# Patient Record
Sex: MALE | Race: OTHER
[De-identification: names, ages, dates, MRNs, and addresses within clinical notes are randomized per-mention and may not be internally consistent; named-entity substitution may affect disease eponyms.]

---

## 2020-06-07 ENCOUNTER — TRANSCRIPTION ENCOUNTER (OUTPATIENT)
Age: 60
End: 2020-06-07

## 2021-05-19 ENCOUNTER — TRANSCRIPTION ENCOUNTER (OUTPATIENT)
Age: 61
End: 2021-05-19

## 2021-05-23 ENCOUNTER — EMERGENCY (EMERGENCY)
Facility: HOSPITAL | Age: 61
LOS: 0 days | Discharge: HOME | End: 2021-05-23
Attending: EMERGENCY MEDICINE
Payer: COMMERCIAL

## 2021-05-23 VITALS
HEART RATE: 80 BPM | TEMPERATURE: 99 F | OXYGEN SATURATION: 94 % | SYSTOLIC BLOOD PRESSURE: 109 MMHG | RESPIRATION RATE: 21 BRPM | DIASTOLIC BLOOD PRESSURE: 70 MMHG

## 2021-05-23 VITALS — HEIGHT: 70 IN | WEIGHT: 190.04 LBS

## 2021-05-23 DIAGNOSIS — I25.2 OLD MYOCARDIAL INFARCTION: ICD-10-CM

## 2021-05-23 DIAGNOSIS — R06.02 SHORTNESS OF BREATH: ICD-10-CM

## 2021-05-23 DIAGNOSIS — R53.1 WEAKNESS: ICD-10-CM

## 2021-05-23 DIAGNOSIS — R19.7 DIARRHEA, UNSPECIFIED: ICD-10-CM

## 2021-05-23 DIAGNOSIS — R00.0 TACHYCARDIA, UNSPECIFIED: ICD-10-CM

## 2021-05-23 DIAGNOSIS — U07.1 COVID-19: ICD-10-CM

## 2021-05-23 DIAGNOSIS — R11.2 NAUSEA WITH VOMITING, UNSPECIFIED: ICD-10-CM

## 2021-05-23 LAB
ALBUMIN SERPL ELPH-MCNC: 3.3 G/DL — SIGNIFICANT CHANGE UP (ref 3.3–5)
ALP SERPL-CCNC: 61 U/L — SIGNIFICANT CHANGE UP (ref 40–120)
ALT FLD-CCNC: 25 U/L — SIGNIFICANT CHANGE UP (ref 12–78)
ANION GAP SERPL CALC-SCNC: 8 MMOL/L — SIGNIFICANT CHANGE UP (ref 5–17)
AST SERPL-CCNC: 26 U/L — SIGNIFICANT CHANGE UP (ref 15–37)
BASOPHILS # BLD AUTO: 0 K/UL — SIGNIFICANT CHANGE UP (ref 0–0.2)
BASOPHILS NFR BLD AUTO: 0 % — SIGNIFICANT CHANGE UP (ref 0–2)
BILIRUB SERPL-MCNC: 0.4 MG/DL — SIGNIFICANT CHANGE UP (ref 0.2–1.2)
BUN SERPL-MCNC: 25 MG/DL — HIGH (ref 7–23)
CALCIUM SERPL-MCNC: 8.7 MG/DL — SIGNIFICANT CHANGE UP (ref 8.5–10.1)
CHLORIDE SERPL-SCNC: 98 MMOL/L — SIGNIFICANT CHANGE UP (ref 96–108)
CO2 SERPL-SCNC: 23 MMOL/L — SIGNIFICANT CHANGE UP (ref 22–31)
CREAT SERPL-MCNC: 1.56 MG/DL — HIGH (ref 0.5–1.3)
EOSINOPHIL # BLD AUTO: 0 K/UL — SIGNIFICANT CHANGE UP (ref 0–0.5)
EOSINOPHIL NFR BLD AUTO: 0 % — SIGNIFICANT CHANGE UP (ref 0–6)
GLUCOSE SERPL-MCNC: 402 MG/DL — HIGH (ref 70–99)
HCT VFR BLD CALC: 41.5 % — SIGNIFICANT CHANGE UP (ref 39–50)
HGB BLD-MCNC: 13.9 G/DL — SIGNIFICANT CHANGE UP (ref 13–17)
IMM GRANULOCYTES NFR BLD AUTO: 0.2 % — SIGNIFICANT CHANGE UP (ref 0–1.5)
LACTATE SERPL-SCNC: 1.6 MMOL/L — SIGNIFICANT CHANGE UP (ref 0.7–2)
LYMPHOCYTES # BLD AUTO: 0.44 K/UL — LOW (ref 1–3.3)
LYMPHOCYTES # BLD AUTO: 10.6 % — LOW (ref 13–44)
MCHC RBC-ENTMCNC: 30.5 PG — SIGNIFICANT CHANGE UP (ref 27–34)
MCHC RBC-ENTMCNC: 33.5 GM/DL — SIGNIFICANT CHANGE UP (ref 32–36)
MCV RBC AUTO: 91.2 FL — SIGNIFICANT CHANGE UP (ref 80–100)
MONOCYTES # BLD AUTO: 0.24 K/UL — SIGNIFICANT CHANGE UP (ref 0–0.9)
MONOCYTES NFR BLD AUTO: 5.8 % — SIGNIFICANT CHANGE UP (ref 2–14)
NEUTROPHILS # BLD AUTO: 3.45 K/UL — SIGNIFICANT CHANGE UP (ref 1.8–7.4)
NEUTROPHILS NFR BLD AUTO: 83.4 % — HIGH (ref 43–77)
PLATELET # BLD AUTO: 116 K/UL — LOW (ref 150–400)
POTASSIUM SERPL-MCNC: 4.2 MMOL/L — SIGNIFICANT CHANGE UP (ref 3.5–5.3)
POTASSIUM SERPL-SCNC: 4.2 MMOL/L — SIGNIFICANT CHANGE UP (ref 3.5–5.3)
PROT SERPL-MCNC: 7.2 GM/DL — SIGNIFICANT CHANGE UP (ref 6–8.3)
RBC # BLD: 4.55 M/UL — SIGNIFICANT CHANGE UP (ref 4.2–5.8)
RBC # FLD: 12.1 % — SIGNIFICANT CHANGE UP (ref 10.3–14.5)
SODIUM SERPL-SCNC: 129 MMOL/L — LOW (ref 135–145)
TROPONIN I SERPL-MCNC: <0.015 NG/ML — SIGNIFICANT CHANGE UP (ref 0.01–0.04)
WBC # BLD: 4.14 K/UL — SIGNIFICANT CHANGE UP (ref 3.8–10.5)
WBC # FLD AUTO: 4.14 K/UL — SIGNIFICANT CHANGE UP (ref 3.8–10.5)

## 2021-05-23 PROCEDURE — 80053 COMPREHEN METABOLIC PANEL: CPT

## 2021-05-23 PROCEDURE — 85025 COMPLETE CBC W/AUTO DIFF WBC: CPT

## 2021-05-23 PROCEDURE — 99291 CRITICAL CARE FIRST HOUR: CPT

## 2021-05-23 PROCEDURE — 71045 X-RAY EXAM CHEST 1 VIEW: CPT

## 2021-05-23 PROCEDURE — 36415 COLL VENOUS BLD VENIPUNCTURE: CPT

## 2021-05-23 PROCEDURE — 83605 ASSAY OF LACTIC ACID: CPT

## 2021-05-23 PROCEDURE — 93005 ELECTROCARDIOGRAM TRACING: CPT

## 2021-05-23 PROCEDURE — 71045 X-RAY EXAM CHEST 1 VIEW: CPT | Mod: 26

## 2021-05-23 PROCEDURE — 84484 ASSAY OF TROPONIN QUANT: CPT

## 2021-05-23 PROCEDURE — M0245: CPT | Mod: XU

## 2021-05-23 PROCEDURE — 99291 CRITICAL CARE FIRST HOUR: CPT | Mod: 25

## 2021-05-23 PROCEDURE — 93010 ELECTROCARDIOGRAM REPORT: CPT

## 2021-05-23 RX ORDER — SODIUM CHLORIDE 9 MG/ML
1000 INJECTION INTRAMUSCULAR; INTRAVENOUS; SUBCUTANEOUS ONCE
Refills: 0 | Status: COMPLETED | OUTPATIENT
Start: 2021-05-23 | End: 2021-05-23

## 2021-05-23 RX ORDER — SODIUM CHLORIDE 9 MG/ML
250 INJECTION INTRAMUSCULAR; INTRAVENOUS; SUBCUTANEOUS
Refills: 0 | Status: COMPLETED | OUTPATIENT
Start: 2021-05-23 | End: 2021-05-23

## 2021-05-23 RX ORDER — ACETAMINOPHEN 500 MG
650 TABLET ORAL ONCE
Refills: 0 | Status: COMPLETED | OUTPATIENT
Start: 2021-05-23 | End: 2021-05-23

## 2021-05-23 RX ADMIN — Medication 650 MILLIGRAM(S): at 15:38

## 2021-05-23 RX ADMIN — SODIUM CHLORIDE 1000 MILLILITER(S): 9 INJECTION INTRAMUSCULAR; INTRAVENOUS; SUBCUTANEOUS at 15:38

## 2021-05-23 RX ADMIN — SODIUM CHLORIDE 25 MILLILITER(S): 9 INJECTION INTRAMUSCULAR; INTRAVENOUS; SUBCUTANEOUS at 17:57

## 2021-05-23 NOTE — ED PROVIDER NOTE - NS ED ROS FT
Constitutional: No fever or chills, +weakness  Eyes: No visual changes  HEENT: No throat pain  CV: No chest pain  Resp: +SOB, no cough  GI: No abd pain, +N/V/D  : No dysuria  MSK: No musculoskeletal pain  Skin: No rash  Neuro: No headache

## 2021-05-23 NOTE — ED ADULT NURSE NOTE - OBJECTIVE STATEMENT
+COVID since 5/19.  C/o weakness, headache, nausea and loss of appetite, intermittent SOB.  Wife tested positive first.  Did not receive vaccine.

## 2021-05-23 NOTE — ED PROVIDER NOTE - CRITICAL CARE ATTENDING CONTRIBUTION TO CARE
Thais PACK M.D. independently provided 35 minutes of critical care including but not limited to talking to consultants, speaking with patient and family and reviewing lab and radiology results.

## 2021-05-23 NOTE — ED PROVIDER NOTE - OBJECTIVE STATEMENT
62 y/o male with PMHx of presents to ED COVID+ c/o SOB and N/V/D. 62 y/o male with PMHx of presents to ED COVID+ c/o SOB, N/V/D and weakness. Pt tested positive at [facility] on the 19th, has positive results with him c/o SOB, N/V/D and weakness. Pt has not required any supplemental O2 but has been having vomiting and diarrhea, overall feels unwell. Has been quarantining at home, denies chest pain, urinary sx or any other complaints. SOB exacerbated with movement and partially alleviated by rest. No prior hx of DVT, PE or recent travel.

## 2021-05-23 NOTE — ED PROVIDER NOTE - PATIENT PORTAL LINK FT
You can access the FollowMyHealth Patient Portal offered by [facility] by registering at the following website: [email or web address]. By joining [employer]’s FollowMyHealth portal, you will also be able to view your health information using other applications (apps) compatible with our system.

## 2021-05-23 NOTE — ED PROVIDER NOTE - PROGRESS NOTE DETAILS
Titus SHETH for ED attending Dr. Lynch: Although pt meets sepsis criteria, pt is COVID +, do not believe pt is septic, will not give 31 cc per kilo bolus at this time. Titus Lynch  CXR: No pneumonia, possibly COVID related.   I Edwar Stevens attest that this documentation has been prepared under the direction and in the presence of Dr. Lynch. pt feeeling better, ambulated o2 sat stazble, will dc home with return precautions, no reactions from antibody infusion

## 2021-05-23 NOTE — ED PROVIDER NOTE - CLINICAL SUMMARY MEDICAL DECISION MAKING FREE TEXT BOX
62 y/o male COVID + p/w SOB, N/V/D, weakness, pt ambulated in room, o2 sats did not drop below 91%, pt has not required supplemental oxygen. Pt has hx of HTN, HLD, DM, is candidate for monoclonal antibodies, discussed with pt and pt is amenable to it, will give pt more information and give antibodies, will cont to get CXR, obtain blood work and treat.

## 2021-05-23 NOTE — ED PROVIDER NOTE - PHYSICAL EXAMINATION
Constitutional: NAD AAOx3, middle age male sitting in bed, +in mild distress  Eyes: PERRLA EOMI  Head: Normocephalic atraumatic  Mouth: MMM  Cardiac: regular rate   Resp: Lungs +coarse breath sounds bilaterally   GI: Abd s/nt/nd, no rebound or guarding.  Neuro: awake, alert, moving all extremities, cranial nerves 2-12 intact, sensation intact, no dysmetria.  Skin: No rashes

## 2021-05-25 ENCOUNTER — INPATIENT (INPATIENT)
Facility: HOSPITAL | Age: 61
LOS: 6 days | Discharge: HOME HEALTH | DRG: 177 | End: 2021-06-01
Attending: INTERNAL MEDICINE | Admitting: INTERNAL MEDICINE
Payer: COMMERCIAL

## 2021-05-25 ENCOUNTER — TRANSCRIPTION ENCOUNTER (OUTPATIENT)
Age: 61
End: 2021-05-25

## 2021-05-25 VITALS
DIASTOLIC BLOOD PRESSURE: 64 MMHG | HEART RATE: 100 BPM | OXYGEN SATURATION: 93 % | TEMPERATURE: 99 F | SYSTOLIC BLOOD PRESSURE: 112 MMHG | WEIGHT: 160.06 LBS | HEIGHT: 70 IN | RESPIRATION RATE: 18 BRPM

## 2021-05-25 DIAGNOSIS — J18.9 PNEUMONIA, UNSPECIFIED ORGANISM: ICD-10-CM

## 2021-05-25 PROBLEM — Z78.9 OTHER SPECIFIED HEALTH STATUS: Chronic | Status: ACTIVE | Noted: 2021-05-23

## 2021-05-25 LAB
ADD ON TEST-SPECIMEN IN LAB: SIGNIFICANT CHANGE UP
ALBUMIN SERPL ELPH-MCNC: 2.5 G/DL — LOW (ref 3.3–5)
ALBUMIN SERPL ELPH-MCNC: 2.8 G/DL — LOW (ref 3.3–5)
ALP SERPL-CCNC: 55 U/L — SIGNIFICANT CHANGE UP (ref 40–120)
ALP SERPL-CCNC: 56 U/L — SIGNIFICANT CHANGE UP (ref 40–120)
ALT FLD-CCNC: 21 U/L — SIGNIFICANT CHANGE UP (ref 12–78)
ALT FLD-CCNC: 22 U/L — SIGNIFICANT CHANGE UP (ref 12–78)
ANION GAP SERPL CALC-SCNC: 11 MMOL/L — SIGNIFICANT CHANGE UP (ref 5–17)
AST SERPL-CCNC: 28 U/L — SIGNIFICANT CHANGE UP (ref 15–37)
AST SERPL-CCNC: 30 U/L — SIGNIFICANT CHANGE UP (ref 15–37)
BASOPHILS # BLD AUTO: 0.01 K/UL — SIGNIFICANT CHANGE UP (ref 0–0.2)
BASOPHILS NFR BLD AUTO: 0.1 % — SIGNIFICANT CHANGE UP (ref 0–2)
BILIRUB DIRECT SERPL-MCNC: <0.1 MG/DL — SIGNIFICANT CHANGE UP (ref 0–0.2)
BILIRUB INDIRECT FLD-MCNC: >0.2 MG/DL — SIGNIFICANT CHANGE UP (ref 0.2–1)
BILIRUB SERPL-MCNC: 0.3 MG/DL — SIGNIFICANT CHANGE UP (ref 0.2–1.2)
BILIRUB SERPL-MCNC: 0.4 MG/DL — SIGNIFICANT CHANGE UP (ref 0.2–1.2)
BUN SERPL-MCNC: 20 MG/DL — SIGNIFICANT CHANGE UP (ref 7–23)
CALCIUM SERPL-MCNC: 8.9 MG/DL — SIGNIFICANT CHANGE UP (ref 8.5–10.1)
CHLORIDE SERPL-SCNC: 98 MMOL/L — SIGNIFICANT CHANGE UP (ref 96–108)
CO2 SERPL-SCNC: 20 MMOL/L — LOW (ref 22–31)
CREAT SERPL-MCNC: 1.13 MG/DL — SIGNIFICANT CHANGE UP (ref 0.5–1.3)
CREAT SERPL-MCNC: 1.42 MG/DL — HIGH (ref 0.5–1.3)
CRP SERPL-MCNC: 174 MG/L — HIGH
D DIMER BLD IA.RAPID-MCNC: 331 NG/ML DDU — HIGH
EOSINOPHIL # BLD AUTO: 0 K/UL — SIGNIFICANT CHANGE UP (ref 0–0.5)
EOSINOPHIL NFR BLD AUTO: 0 % — SIGNIFICANT CHANGE UP (ref 0–6)
FERRITIN SERPL-MCNC: 1310 NG/ML — HIGH (ref 30–400)
GLUCOSE SERPL-MCNC: 511 MG/DL — CRITICAL HIGH (ref 70–99)
HCT VFR BLD CALC: 41.3 % — SIGNIFICANT CHANGE UP (ref 39–50)
HGB BLD-MCNC: 13.6 G/DL — SIGNIFICANT CHANGE UP (ref 13–17)
IMM GRANULOCYTES NFR BLD AUTO: 0.8 % — SIGNIFICANT CHANGE UP (ref 0–1.5)
INR BLD: 1.4 RATIO — HIGH (ref 0.88–1.16)
LYMPHOCYTES # BLD AUTO: 0.62 K/UL — LOW (ref 1–3.3)
LYMPHOCYTES # BLD AUTO: 6.4 % — LOW (ref 13–44)
MCHC RBC-ENTMCNC: 30.4 PG — SIGNIFICANT CHANGE UP (ref 27–34)
MCHC RBC-ENTMCNC: 32.9 GM/DL — SIGNIFICANT CHANGE UP (ref 32–36)
MCV RBC AUTO: 92.2 FL — SIGNIFICANT CHANGE UP (ref 80–100)
MONOCYTES # BLD AUTO: 0.32 K/UL — SIGNIFICANT CHANGE UP (ref 0–0.9)
MONOCYTES NFR BLD AUTO: 3.3 % — SIGNIFICANT CHANGE UP (ref 2–14)
NEUTROPHILS # BLD AUTO: 8.68 K/UL — HIGH (ref 1.8–7.4)
NEUTROPHILS NFR BLD AUTO: 89.4 % — HIGH (ref 43–77)
PLATELET # BLD AUTO: 151 K/UL — SIGNIFICANT CHANGE UP (ref 150–400)
POTASSIUM SERPL-MCNC: 4.2 MMOL/L — SIGNIFICANT CHANGE UP (ref 3.5–5.3)
POTASSIUM SERPL-SCNC: 4.2 MMOL/L — SIGNIFICANT CHANGE UP (ref 3.5–5.3)
PROCALCITONIN SERPL-MCNC: 1.2 NG/ML — HIGH (ref 0.02–0.1)
PROT SERPL-MCNC: 6.9 GM/DL — SIGNIFICANT CHANGE UP (ref 6–8.3)
PROT SERPL-MCNC: 7.1 GM/DL — SIGNIFICANT CHANGE UP (ref 6–8.3)
PROTHROM AB SERPL-ACNC: 16.1 SEC — HIGH (ref 10.6–13.6)
RBC # BLD: 4.48 M/UL — SIGNIFICANT CHANGE UP (ref 4.2–5.8)
RBC # FLD: 12.5 % — SIGNIFICANT CHANGE UP (ref 10.3–14.5)
SODIUM SERPL-SCNC: 129 MMOL/L — LOW (ref 135–145)
TROPONIN I SERPL-MCNC: <0.015 NG/ML — SIGNIFICANT CHANGE UP (ref 0.01–0.04)
WBC # BLD: 9.71 K/UL — SIGNIFICANT CHANGE UP (ref 3.8–10.5)
WBC # FLD AUTO: 9.71 K/UL — SIGNIFICANT CHANGE UP (ref 3.8–10.5)

## 2021-05-25 PROCEDURE — 85610 PROTHROMBIN TIME: CPT

## 2021-05-25 PROCEDURE — 85027 COMPLETE CBC AUTOMATED: CPT

## 2021-05-25 PROCEDURE — 86803 HEPATITIS C AB TEST: CPT

## 2021-05-25 PROCEDURE — 82803 BLOOD GASES ANY COMBINATION: CPT

## 2021-05-25 PROCEDURE — 86769 SARS-COV-2 COVID-19 ANTIBODY: CPT

## 2021-05-25 PROCEDURE — 99285 EMERGENCY DEPT VISIT HI MDM: CPT | Mod: 25

## 2021-05-25 PROCEDURE — 85379 FIBRIN DEGRADATION QUANT: CPT

## 2021-05-25 PROCEDURE — 83036 HEMOGLOBIN GLYCOSYLATED A1C: CPT

## 2021-05-25 PROCEDURE — 36415 COLL VENOUS BLD VENIPUNCTURE: CPT

## 2021-05-25 PROCEDURE — 93010 ELECTROCARDIOGRAM REPORT: CPT

## 2021-05-25 PROCEDURE — 71045 X-RAY EXAM CHEST 1 VIEW: CPT | Mod: 26

## 2021-05-25 PROCEDURE — 99223 1ST HOSP IP/OBS HIGH 75: CPT

## 2021-05-25 PROCEDURE — 36600 WITHDRAWAL OF ARTERIAL BLOOD: CPT

## 2021-05-25 PROCEDURE — 80053 COMPREHEN METABOLIC PANEL: CPT

## 2021-05-25 PROCEDURE — 82565 ASSAY OF CREATININE: CPT

## 2021-05-25 PROCEDURE — 80076 HEPATIC FUNCTION PANEL: CPT

## 2021-05-25 PROCEDURE — 99285 EMERGENCY DEPT VISIT HI MDM: CPT

## 2021-05-25 PROCEDURE — 82962 GLUCOSE BLOOD TEST: CPT

## 2021-05-25 PROCEDURE — 86140 C-REACTIVE PROTEIN: CPT

## 2021-05-25 PROCEDURE — C9399: CPT

## 2021-05-25 PROCEDURE — 96374 THER/PROPH/DIAG INJ IV PUSH: CPT

## 2021-05-25 PROCEDURE — 94760 N-INVAS EAR/PLS OXIMETRY 1: CPT

## 2021-05-25 PROCEDURE — 82728 ASSAY OF FERRITIN: CPT

## 2021-05-25 RX ORDER — OXYCODONE HYDROCHLORIDE 5 MG/1
1 TABLET ORAL
Qty: 0 | Refills: 0 | DISCHARGE

## 2021-05-25 RX ORDER — METOPROLOL TARTRATE 50 MG
1 TABLET ORAL
Qty: 0 | Refills: 0 | DISCHARGE

## 2021-05-25 RX ORDER — FAMOTIDINE 10 MG/ML
20 INJECTION INTRAVENOUS AT BEDTIME
Refills: 0 | Status: DISCONTINUED | OUTPATIENT
Start: 2021-05-25 | End: 2021-06-01

## 2021-05-25 RX ORDER — INSULIN LISPRO 100 [IU]/ML
45 INJECTION, SUSPENSION SUBCUTANEOUS
Qty: 0 | Refills: 0 | DISCHARGE

## 2021-05-25 RX ORDER — INSULIN LISPRO 100/ML
VIAL (ML) SUBCUTANEOUS
Refills: 0 | Status: DISCONTINUED | OUTPATIENT
Start: 2021-05-25 | End: 2021-06-01

## 2021-05-25 RX ORDER — SODIUM CHLORIDE 9 MG/ML
1000 INJECTION INTRAMUSCULAR; INTRAVENOUS; SUBCUTANEOUS ONCE
Refills: 0 | Status: COMPLETED | OUTPATIENT
Start: 2021-05-25 | End: 2021-05-25

## 2021-05-25 RX ORDER — DEXAMETHASONE 0.5 MG/5ML
6 ELIXIR ORAL ONCE
Refills: 0 | Status: COMPLETED | OUTPATIENT
Start: 2021-05-25 | End: 2021-05-25

## 2021-05-25 RX ORDER — SIMVASTATIN 20 MG/1
20 TABLET, FILM COATED ORAL AT BEDTIME
Refills: 0 | Status: DISCONTINUED | OUTPATIENT
Start: 2021-05-25 | End: 2021-06-01

## 2021-05-25 RX ORDER — DEXAMETHASONE 0.5 MG/5ML
6 ELIXIR ORAL DAILY
Refills: 0 | Status: DISCONTINUED | OUTPATIENT
Start: 2021-05-25 | End: 2021-06-01

## 2021-05-25 RX ORDER — HYDROXYZINE HCL 10 MG
1 TABLET ORAL
Qty: 0 | Refills: 0 | DISCHARGE

## 2021-05-25 RX ORDER — REMDESIVIR 5 MG/ML
INJECTION INTRAVENOUS
Refills: 0 | Status: COMPLETED | OUTPATIENT
Start: 2021-05-25 | End: 2021-05-29

## 2021-05-25 RX ORDER — EMPAGLIFLOZIN AND LINAGLIPTIN 10; 5 MG/1; MG/1
1 TABLET, FILM COATED ORAL
Qty: 0 | Refills: 0 | DISCHARGE

## 2021-05-25 RX ORDER — FAMOTIDINE 10 MG/ML
1 INJECTION INTRAVENOUS
Qty: 0 | Refills: 0 | DISCHARGE

## 2021-05-25 RX ORDER — METOPROLOL TARTRATE 50 MG
100 TABLET ORAL DAILY
Refills: 0 | Status: DISCONTINUED | OUTPATIENT
Start: 2021-05-25 | End: 2021-06-01

## 2021-05-25 RX ORDER — SIMVASTATIN 20 MG/1
1 TABLET, FILM COATED ORAL
Qty: 0 | Refills: 0 | DISCHARGE

## 2021-05-25 RX ORDER — DEXTROSE 50 % IN WATER 50 %
25 SYRINGE (ML) INTRAVENOUS ONCE
Refills: 0 | Status: DISCONTINUED | OUTPATIENT
Start: 2021-05-25 | End: 2021-06-01

## 2021-05-25 RX ORDER — SODIUM CHLORIDE 9 MG/ML
1000 INJECTION, SOLUTION INTRAVENOUS
Refills: 0 | Status: DISCONTINUED | OUTPATIENT
Start: 2021-05-25 | End: 2021-06-01

## 2021-05-25 RX ORDER — GLIMEPIRIDE 1 MG
1 TABLET ORAL
Qty: 0 | Refills: 0 | DISCHARGE

## 2021-05-25 RX ORDER — ALPRAZOLAM 0.25 MG
1 TABLET ORAL
Qty: 0 | Refills: 0 | DISCHARGE

## 2021-05-25 RX ORDER — ASPIRIN/CALCIUM CARB/MAGNESIUM 324 MG
81 TABLET ORAL DAILY
Refills: 0 | Status: DISCONTINUED | OUTPATIENT
Start: 2021-05-25 | End: 2021-06-01

## 2021-05-25 RX ORDER — INSULIN LISPRO 100/ML
VIAL (ML) SUBCUTANEOUS AT BEDTIME
Refills: 0 | Status: DISCONTINUED | OUTPATIENT
Start: 2021-05-25 | End: 2021-06-01

## 2021-05-25 RX ORDER — HYDROXYZINE HCL 10 MG
25 TABLET ORAL THREE TIMES A DAY
Refills: 0 | Status: DISCONTINUED | OUTPATIENT
Start: 2021-05-25 | End: 2021-06-01

## 2021-05-25 RX ORDER — GLUCAGON INJECTION, SOLUTION 0.5 MG/.1ML
1 INJECTION, SOLUTION SUBCUTANEOUS ONCE
Refills: 0 | Status: DISCONTINUED | OUTPATIENT
Start: 2021-05-25 | End: 2021-06-01

## 2021-05-25 RX ORDER — ACETAMINOPHEN 500 MG
650 TABLET ORAL EVERY 4 HOURS
Refills: 0 | Status: DISCONTINUED | OUTPATIENT
Start: 2021-05-25 | End: 2021-06-01

## 2021-05-25 RX ORDER — OXYCODONE HYDROCHLORIDE 5 MG/1
10 TABLET ORAL EVERY 12 HOURS
Refills: 0 | Status: COMPLETED | OUTPATIENT
Start: 2021-05-25 | End: 2021-06-01

## 2021-05-25 RX ORDER — INSULIN GLARGINE 100 [IU]/ML
20 INJECTION, SOLUTION SUBCUTANEOUS AT BEDTIME
Refills: 0 | Status: DISCONTINUED | OUTPATIENT
Start: 2021-05-25 | End: 2021-05-26

## 2021-05-25 RX ORDER — ONDANSETRON 8 MG/1
4 TABLET, FILM COATED ORAL EVERY 6 HOURS
Refills: 0 | Status: DISCONTINUED | OUTPATIENT
Start: 2021-05-25 | End: 2021-06-01

## 2021-05-25 RX ORDER — INSULIN HUMAN 100 [IU]/ML
6 INJECTION, SOLUTION SUBCUTANEOUS ONCE
Refills: 0 | Status: COMPLETED | OUTPATIENT
Start: 2021-05-25 | End: 2021-05-25

## 2021-05-25 RX ORDER — ASPIRIN/CALCIUM CARB/MAGNESIUM 324 MG
1 TABLET ORAL
Qty: 0 | Refills: 0 | DISCHARGE

## 2021-05-25 RX ORDER — REMDESIVIR 5 MG/ML
100 INJECTION INTRAVENOUS EVERY 24 HOURS
Refills: 0 | Status: COMPLETED | OUTPATIENT
Start: 2021-05-26 | End: 2021-05-29

## 2021-05-25 RX ORDER — DEXTROSE 50 % IN WATER 50 %
15 SYRINGE (ML) INTRAVENOUS ONCE
Refills: 0 | Status: DISCONTINUED | OUTPATIENT
Start: 2021-05-25 | End: 2021-06-01

## 2021-05-25 RX ORDER — METFORMIN HYDROCHLORIDE 850 MG/1
1 TABLET ORAL
Qty: 0 | Refills: 0 | DISCHARGE

## 2021-05-25 RX ORDER — ALBUTEROL 90 UG/1
2 AEROSOL, METERED ORAL EVERY 4 HOURS
Refills: 0 | Status: DISCONTINUED | OUTPATIENT
Start: 2021-05-25 | End: 2021-06-01

## 2021-05-25 RX ORDER — ENOXAPARIN SODIUM 100 MG/ML
40 INJECTION SUBCUTANEOUS DAILY
Refills: 0 | Status: DISCONTINUED | OUTPATIENT
Start: 2021-05-25 | End: 2021-06-01

## 2021-05-25 RX ORDER — REMDESIVIR 5 MG/ML
200 INJECTION INTRAVENOUS EVERY 24 HOURS
Refills: 0 | Status: COMPLETED | OUTPATIENT
Start: 2021-05-25 | End: 2021-05-25

## 2021-05-25 RX ORDER — DUPILUMAB 300 MG/2ML
0 INJECTION, SOLUTION SUBCUTANEOUS
Qty: 0 | Refills: 0 | DISCHARGE

## 2021-05-25 RX ORDER — DEXTROSE 50 % IN WATER 50 %
12.5 SYRINGE (ML) INTRAVENOUS ONCE
Refills: 0 | Status: DISCONTINUED | OUTPATIENT
Start: 2021-05-25 | End: 2021-06-01

## 2021-05-25 RX ORDER — ALPRAZOLAM 0.25 MG
0.25 TABLET ORAL
Refills: 0 | Status: DISCONTINUED | OUTPATIENT
Start: 2021-05-25 | End: 2021-05-27

## 2021-05-25 RX ORDER — LOSARTAN POTASSIUM 100 MG/1
25 TABLET, FILM COATED ORAL DAILY
Refills: 0 | Status: DISCONTINUED | OUTPATIENT
Start: 2021-05-25 | End: 2021-06-01

## 2021-05-25 RX ORDER — FAMOTIDINE 10 MG/ML
40 INJECTION INTRAVENOUS AT BEDTIME
Refills: 0 | Status: DISCONTINUED | OUTPATIENT
Start: 2021-05-25 | End: 2021-05-25

## 2021-05-25 RX ORDER — OXYCODONE AND ACETAMINOPHEN 5; 325 MG/1; MG/1
1 TABLET ORAL EVERY 4 HOURS
Refills: 0 | Status: DISCONTINUED | OUTPATIENT
Start: 2021-05-25 | End: 2021-05-25

## 2021-05-25 RX ORDER — INSULIN LISPRO 100/ML
8 VIAL (ML) SUBCUTANEOUS
Refills: 0 | Status: DISCONTINUED | OUTPATIENT
Start: 2021-05-25 | End: 2021-06-01

## 2021-05-25 RX ORDER — LOSARTAN POTASSIUM 100 MG/1
1 TABLET, FILM COATED ORAL
Qty: 0 | Refills: 0 | DISCHARGE

## 2021-05-25 RX ADMIN — SODIUM CHLORIDE 1000 MILLILITER(S): 9 INJECTION INTRAMUSCULAR; INTRAVENOUS; SUBCUTANEOUS at 12:40

## 2021-05-25 RX ADMIN — INSULIN GLARGINE 20 UNIT(S): 100 INJECTION, SOLUTION SUBCUTANEOUS at 22:43

## 2021-05-25 RX ADMIN — SIMVASTATIN 20 MILLIGRAM(S): 20 TABLET, FILM COATED ORAL at 22:44

## 2021-05-25 RX ADMIN — Medication 8: at 18:32

## 2021-05-25 RX ADMIN — FAMOTIDINE 20 MILLIGRAM(S): 10 INJECTION INTRAVENOUS at 22:44

## 2021-05-25 RX ADMIN — REMDESIVIR 580 MILLIGRAM(S): 5 INJECTION INTRAVENOUS at 23:01

## 2021-05-25 RX ADMIN — OXYCODONE HYDROCHLORIDE 10 MILLIGRAM(S): 5 TABLET ORAL at 22:44

## 2021-05-25 RX ADMIN — Medication 6 MILLIGRAM(S): at 11:50

## 2021-05-25 RX ADMIN — Medication 4: at 22:44

## 2021-05-25 RX ADMIN — INSULIN HUMAN 6 UNIT(S): 100 INJECTION, SOLUTION SUBCUTANEOUS at 12:40

## 2021-05-25 NOTE — ED ADULT TRIAGE NOTE - CHIEF COMPLAINT QUOTE
pt presents to ED due to complaints of worsening SOB COVID + 5/17 received antibodies on Sunday when he was a pt at  ED

## 2021-05-25 NOTE — ED CLERICAL - NS ED CLERK NOTE PRE-ARRIVAL INFORMATION; ADDITIONAL PRE-ARRIVAL INFORMATION
This patient recently had Antibody Infusion for COVID and has active care navigation. This patient can be followed up by the care navigation team within 24 hours. To arrange close follow-up or to obtain additional clinical information about this patient, please call the contact number above

## 2021-05-25 NOTE — ED STATDOCS - NS_ ATTENDINGSCRIBEDETAILS _ED_A_ED_FT
I, Jasiel Rivas MD, performed the initial face to face bedside interview with this patient regarding history of present illness and determined that the patient should be seen in the main ED.  The history, was documented by the scribe in my presence and I attest to the accuracy of the documentation.

## 2021-05-25 NOTE — H&P ADULT - ASSESSMENT
62 y/o male with PMHX of IDDM, HTN, HLD who presented to  with progressive weakness related to COVID-19 infection.  Spo2 was 93% in ER at rest-- patient now 94% on 3L NC.      #Acute Hypoxic Respiratory Failure secondary to COVID -19 PNA:    Admit to medsurg.    Cont NC @ 3L.  Follow O2 sats.    IV decadron 6 mg IV daily.    IV remdesivir daily.    ID eval.    Trend inflammatory markers.  DDimer 300's.  Ferritin 1300.    S/p monoclonal AB infusion on 5/24.      #Uncontrolled IDDM:    Patient notes hasn't been eating/ has not taken insulin in 3 weeks.    Previously using Humalog 75/25 45 units BID.    Start lantus 20 qhs.    Start humalog 5 TID with meals.    Sliding scale.    Check A1C.    No signs/ sx of DKA at present.      #Hyponatremia:    Suspect more pseudohyponatremia due to hyperglycemia.  's.      #HTN:    Cont home meds.      #HLD:    Cont home meds.      #Chronic Pain:    Home pain meds nonformulary.    Start oxycontin 10 BID.    Percocet PRN.      #DVT Proph:    Lovenox.

## 2021-05-25 NOTE — H&P ADULT - NSHPREVIEWOFSYSTEMS_GEN_ALL_CORE
ROS:  General:  weakness  Skin: No rash or bothersome skin lesions  Musculoskeletal: No arthalgias, myalgias or joint swelling  Eyes: No visual changes or eye pain  Ears: No hearing loss , otorrhea or ear pain  Nose, Mouth, Throat: No nasal congestion, rhinorrhea, oral lesions, postnasal drip or sore throat  Cardio: No chest pain or palpitations. no lower extremity edema. no syncope. no claudication.   Respiratory: No cough, shortness of breath or wheezing   GI: diarrhea. loss of appetitie  : No urinary frequency, hematuria, incontinence, or dysuria  Neurologic: No headaches, parasthesias, confusion, dysarthria or gait instability  Psychiatric:  No anxiety or depression  Lymphatic:  No easy bruising, easy bleeding or swollen glands  Allergic: No itching, sneezing , watery eyes, clear rhinorrhea or recurrent infections

## 2021-05-25 NOTE — PHARMACOTHERAPY INTERVENTION NOTE - COMMENTS
Spoke with Dr. Cooper. Recommended and changed famotidine 40mg to 20mg every night at bedtime based on current CrCl 54.68.

## 2021-05-25 NOTE — ED STATDOCS - PROGRESS NOTE DETAILS
Titus Wright for attending Dr. Rivas: 60 y/o male with no significant PMHx presents to the ED c/o SOB, weakness, diarrhea. Started with symptoms on 5/19. Wife and grandchildren COVID +. Pt received monoclonal antibodies on 5/23.  On exam pt sat 91 to 92% on 2L of O2. Will send pt to main ED for further evaluation.

## 2021-05-25 NOTE — ED PROVIDER NOTE - OBJECTIVE STATEMENT
60 y/o male with a PMHx of HTN, DM presents to the ED c/o increased SOB. Started with symptoms on 5/19 and pt tested + for COVID on the same day with family testing +. Pt received monoclonal antibodies on 5/23 at [facility] but has subsequently worsening SOB, general weakness/fatigue/malaise. NKDA.

## 2021-05-25 NOTE — H&P ADULT - NSHPPHYSICALEXAM_GEN_ALL_CORE
PEx  T(C): 37.2 (05-25-21 @ 12:00), Max: 37.3 (05-25-21 @ 11:09)  HR: 81 (05-25-21 @ 16:00) (78 - 100)  BP: 115/78 (05-25-21 @ 16:00) (110/69 - 117/68)  RR: 22 (05-25-21 @ 16:00) (18 - 26)  SpO2: 95% (05-25-21 @ 16:00) (93% - 95%)    General:     Well appearing, well nourished in no distress, no identifying marks , scars, or tattoos.  Skin: no rash or prominent lesions  Head: normocephalic, atraumatic     Sinuses: non-tender  Nose: no external lesions, mucosa non-inflamed, septum and turbinates normal  Throat: no erythema, exudates or lesions.  Neck: Supple without lymphadenopathy. Thyroid no thyromegaly, no palpable thyroid nodules, no palpable nodules or masses, carotid arteries no bruits.   Breasts: No palpable masses or lesions.  Heart: RRR, no murmur or gallop.  Normal S1, S2.  No S3, S4.   Lungs: CTA bilaterally, no wheezes, rhonchi, rales.  Breathing unlabored.   Chest wall: Normal insp   Abdomen:  Soft, NT/ND, normal bowel sounds, no HSM, no masses.  No peritoneal signs.   Back: spine normal without deformity or tenderness.  Normal ROM   : Exam normal.  no inguinal hernias.  Extremities: No deformities, clubbing, cyanosis, or edema.  Musculoskeletal: Normal gait and station. No decreased range of motion, instability, atrophy or abnormal strength or tone in the head, neck, spine, ribs, pelvis or extremities.   Neurologic: CN 2-12 normal. Sensation to pain, touch and proprioception normal. DTRs normal in upper and lower extremities. No pathologic reflexes.  Motor normal.  Psychiatric: Oriented X3, intact recent and remote memory, judgement and insight, normal mood and affect.

## 2021-05-25 NOTE — ED PROVIDER NOTE - CARE PLAN
Principal Discharge DX:	Bilateral pneumonia  Secondary Diagnosis:	COVID-19  Secondary Diagnosis:	Hypoxia  Secondary Diagnosis:	Uncontrolled diabetes mellitus

## 2021-05-25 NOTE — H&P ADULT - HISTORY OF PRESENT ILLNESS
60 y/o male with PMHX of IDDM, HTN, HLD who presented to  with progressive weakness related to COVID-19 infection.  Patient notes he first starting having symptoms last cony May 16th.  He had multiple negative tests initially but tested positive as of 5/19.  Patient notes several family members at home including his wife and grandson have also tested positive.  Patient states his main symptom has been fatigue/ weakness/ loss of appetite.  Patient notes no real fevers-- Tmax 99.  No significant coughing or SOB.  He has had loss of appetite/ nausea/ and some diarrhea with stool incontinence.  Yesterday, he presented to  ER and was given monoclonal antibodies and was told to return to the ER if sx were not improved.  Patient returns today.  Spo2 was 93% in ER at rest-- patient now 94% on 3L NC.        PAST MEDICAL & SURGICAL HISTORY:  IDDM  HTN  HLD  Chronic pain    FAMILY HISTORY:  Family hx of HTN    Social History:    Social ETOH.    No tob.    No drugs.    Lives at home with family.      Allergies:  No Known Allergies    Home Medications:  ALPRAZolam 0.25 mg oral tablet: 1 tab(s) orally 2 times a day, As Needed - for anxiety (25 May 2021 15:15)  Aspirin Enteric Coated 81 mg oral delayed release tablet: 1 tab(s) orally once a day (25 May 2021 15:15)  Dupixent 300 mg/2 mL subcutaneous solution: Use as directed  ***dose per DrFirst*** (25 May 2021 15:15)  famotidine 40 mg oral tablet: 1 tab(s) orally once a day (at bedtime) (25 May 2021 15:15)  glimepiride 2 mg oral tablet: 1 tab(s) orally once a day (25 May 2021 15:15)  Glyxambi 25 mg-5 mg oral tablet: 1 tab(s) orally once a day (in the morning) (25 May 2021 15:15)  HumaLOG Mix 75/25 subcutaneous suspension: 45 unit(s) subcutaneously 2 times a day (25 May 2021 15:15)  hydrocodone-acetaminophen 10 mg-325 mg oral tablet: 1 tab(s) orally every 4 hours, As Needed for pain  ***per DrFirst*** (25 May 2021 15:15)  hydrOXYzine hydrochloride 10 mg oral tablet: 1 tab(s) orally 3 times a day, As Needed for itching (25 May 2021 15:15)  losartan 25 mg oral tablet: 1 tab(s) orally once a day (25 May 2021 15:15)  metFORMIN 500 mg oral tablet, extended release: 1 tab(s) orally once a day (25 May 2021 15:15)  metoprolol succinate 100 mg oral tablet, extended release: 1 tab(s) orally once a day (25 May 2021 15:15)  simvastatin 20 mg oral tablet: 1 tab(s) orally once a day (at bedtime) (25 May 2021 15:15)  Xtampza ER 9 mg oral capsule, extended release: 1 cap(s) orally every 12 hours  ***On DrFirst- not on list from MD- unable to confirm if pt takes*** (25 May 2021 15:15)

## 2021-05-25 NOTE — ED ADULT NURSE REASSESSMENT NOTE - NS ED NURSE REASSESS COMMENT FT1
MD notified of repeat BGM value and will cont to monitor per MD.  Pt awaiting admission orders and bed availability with pt verbalizing awareness of POC.

## 2021-05-25 NOTE — H&P ADULT - NSHPLABSRESULTS_GEN_ALL_CORE
13.6   9.71  )-----------( 151      ( 25 May 2021 11:43 )             41.3     05-25    129<L>  |  98  |  20  ----------------------------<  511<HH>  4.2   |  20<L>  |  1.42<H>    Ca    8.9      25 May 2021 11:43    TPro  7.1  /  Alb  2.8<L>  /  TBili  0.4  /  DBili  x   /  AST  28  /  ALT  21  /  AlkPhos  56  05-25    CAPILLARY BLOOD GLUCOSE      POCT Blood Glucose.: 400 mg/dL (25 May 2021 15:32)  POCT Blood Glucose.: 473 mg/dL (25 May 2021 12:37)    < from: Xray Chest 1 View- PORTABLE-Urgent (05.25.21 @ 12:53) >    INTERPRETATION:  AP chest on May 25, 2021 at 12:45 PM. Patient is short of breath with cough and fever.    Heart magnified by technique.    Small infiltrate off right upper hilum noted.    This infiltrate more visible than on May 23.    IMPRESSION: Small infiltrate off right upper hilum somewhat increased from prior.    < end of copied text >

## 2021-05-25 NOTE — ED PROVIDER NOTE - CLINICAL SUMMARY MEDICAL DECISION MAKING FREE TEXT BOX
62 y/o male with a PMHx of HTN, DM, presents to the ED c/o increased SOB, generalized weakness/fatigue/malaise. Sx started on  5/19 and pt tested + for COVID on the same day with family testing +. Pt received monoclonal antibodies on 5/23 at [facility]. Today pt hypoxic.    Plan; O2 supplementation, COVID precautions, EKG, CXR, labs including blood cultures (only 1 indicated because of COVID), COVID inflammatory serum marker, IV decadron, IV fluids, monitor, observe and reassess. Pt for admission.  Glucose 500; subcu humulin, diabetic diet, awaiting rest of labs including bicarb level..

## 2021-05-25 NOTE — ED ADULT NURSE NOTE - OBJECTIVE STATEMENT
pt reports worsening covid symptoms. tested positive wednesdasy 5/19. pt s/p antibody treatment at  on sunday. pt states lightheadedness and watery stool, shortness of breath. - abd pain - cramping. pt aaox4, breathing spontaneously w o2 saturation 94% on supplemental O2 3L. pt reports worsening covid symptoms. pt states tested positive wednesdasy 5/19. pt s/p antibody treatment at  on sunday. pt states lightheadedness and watery stool, shortness of breath. - abd pain - cramping. pt aaox4, breathing spontaneously w o2 saturation 94% on supplemental O2 3L. pt reports worsening covid symptoms. pt states tested positive Wednesday 5/19, received antibody treatment at  on sunday. pt states lightheadedness and watery stool, shortness of breath. - abd pain - cramping. pt aaox4, breathing spontaneously w o2 saturation 94% on supplemental O2 3L. pt reports worsening covid symptoms. pt states tested positive Wednesday 5/19, received antibody treatment at  on sunday. pt states occurrences of lightheadedness and watery stool at home, as well as increasing shortness of breath. denies abd pain or cramping.   pt aaox4, moving all extremities equally, breathing spontaneously w o2 saturation 94% on supplemental O2 3L. pt reports worsening covid symptoms. pt states tested positive Wednesday 5/19, received antibody treatment at  on sunday. pt states occurrences of lightheadedness and watery stool at home, as well as increasing shortness of breath. denies abd pain or cramping.   pt aaox4, but poor historian. moving all extremities equally, breathing spontaneously w o2 saturation 94% on supplemental O2 3L.

## 2021-05-25 NOTE — ED ADULT NURSE NOTE - NSIMPLEMENTINTERV_GEN_ALL_ED
Implemented All Fall with Harm Risk Interventions:  [place] to call system. Call bell, personal items and telephone within reach. Instruct patient to call for assistance. Room bathroom lighting operational. Non-slip footwear when patient is off stretcher. Physically safe environment: no spills, clutter or unnecessary equipment. Stretcher in lowest position, wheels locked, appropriate side rails in place. Provide visual cue, wrist band, yellow gown, etc. Monitor gait and stability. Monitor for mental status changes and reorient to person, place, and time. Review medications for side effects contributing to fall risk. Reinforce activity limits and safety measures with patient and family. Provide visual clues: red socks.

## 2021-05-25 NOTE — ED ADULT NURSE REASSESSMENT NOTE - NS ED NURSE REASSESS COMMENT FT1
Pt remains as previously assessed.  No c/o pain or discomfort, in no apparent distress.  Plan of care, transition from ED to admitted status discussed with pt.  All questions answered to pt satisfaction.   Call bell given to patient.  No additional needs at this time, will continue hourly rounding.

## 2021-05-26 LAB
A1C WITH ESTIMATED AVERAGE GLUCOSE RESULT: 9.6 % — HIGH (ref 4–5.6)
ALBUMIN SERPL ELPH-MCNC: 2.4 G/DL — LOW (ref 3.3–5)
ALP SERPL-CCNC: 57 U/L — SIGNIFICANT CHANGE UP (ref 40–120)
ALT FLD-CCNC: 21 U/L — SIGNIFICANT CHANGE UP (ref 12–78)
ANION GAP SERPL CALC-SCNC: 7 MMOL/L — SIGNIFICANT CHANGE UP (ref 5–17)
AST SERPL-CCNC: 28 U/L — SIGNIFICANT CHANGE UP (ref 15–37)
BILIRUB SERPL-MCNC: 0.4 MG/DL — SIGNIFICANT CHANGE UP (ref 0.2–1.2)
BUN SERPL-MCNC: 27 MG/DL — HIGH (ref 7–23)
CALCIUM SERPL-MCNC: 9 MG/DL — SIGNIFICANT CHANGE UP (ref 8.5–10.1)
CHLORIDE SERPL-SCNC: 104 MMOL/L — SIGNIFICANT CHANGE UP (ref 96–108)
CO2 SERPL-SCNC: 24 MMOL/L — SIGNIFICANT CHANGE UP (ref 22–31)
COVID-19 SPIKE DOMAIN AB INTERP: POSITIVE
COVID-19 SPIKE DOMAIN ANTIBODY RESULT: 70.2 U/ML — HIGH
CREAT SERPL-MCNC: 1.07 MG/DL — SIGNIFICANT CHANGE UP (ref 0.5–1.3)
ESTIMATED AVERAGE GLUCOSE: 229 MG/DL — HIGH (ref 68–114)
GLUCOSE SERPL-MCNC: 329 MG/DL — HIGH (ref 70–99)
HCT VFR BLD CALC: 41.2 % — SIGNIFICANT CHANGE UP (ref 39–50)
HCV AB S/CO SERPL IA: 0.09 S/CO — SIGNIFICANT CHANGE UP (ref 0–0.99)
HCV AB SERPL-IMP: SIGNIFICANT CHANGE UP
HGB BLD-MCNC: 13.4 G/DL — SIGNIFICANT CHANGE UP (ref 13–17)
INR BLD: 1.32 RATIO — HIGH (ref 0.88–1.16)
MCHC RBC-ENTMCNC: 30.2 PG — SIGNIFICANT CHANGE UP (ref 27–34)
MCHC RBC-ENTMCNC: 32.5 GM/DL — SIGNIFICANT CHANGE UP (ref 32–36)
MCV RBC AUTO: 92.8 FL — SIGNIFICANT CHANGE UP (ref 80–100)
PLATELET # BLD AUTO: 180 K/UL — SIGNIFICANT CHANGE UP (ref 150–400)
POTASSIUM SERPL-MCNC: 4.3 MMOL/L — SIGNIFICANT CHANGE UP (ref 3.5–5.3)
POTASSIUM SERPL-SCNC: 4.3 MMOL/L — SIGNIFICANT CHANGE UP (ref 3.5–5.3)
PROT SERPL-MCNC: 6.9 GM/DL — SIGNIFICANT CHANGE UP (ref 6–8.3)
PROTHROM AB SERPL-ACNC: 15.1 SEC — HIGH (ref 10.6–13.6)
RBC # BLD: 4.44 M/UL — SIGNIFICANT CHANGE UP (ref 4.2–5.8)
RBC # FLD: 12.8 % — SIGNIFICANT CHANGE UP (ref 10.3–14.5)
SARS-COV-2 IGG+IGM SERPL QL IA: 70.2 U/ML — HIGH
SARS-COV-2 IGG+IGM SERPL QL IA: POSITIVE
SODIUM SERPL-SCNC: 135 MMOL/L — SIGNIFICANT CHANGE UP (ref 135–145)
WBC # BLD: 8.43 K/UL — SIGNIFICANT CHANGE UP (ref 3.8–10.5)
WBC # FLD AUTO: 8.43 K/UL — SIGNIFICANT CHANGE UP (ref 3.8–10.5)

## 2021-05-26 PROCEDURE — 99232 SBSQ HOSP IP/OBS MODERATE 35: CPT

## 2021-05-26 RX ORDER — INSULIN GLARGINE 100 [IU]/ML
45 INJECTION, SOLUTION SUBCUTANEOUS AT BEDTIME
Refills: 0 | Status: DISCONTINUED | OUTPATIENT
Start: 2021-05-26 | End: 2021-06-01

## 2021-05-26 RX ADMIN — Medication 5 UNIT(S): at 11:42

## 2021-05-26 RX ADMIN — Medication 100 MILLIGRAM(S): at 10:28

## 2021-05-26 RX ADMIN — Medication 5 UNIT(S): at 17:06

## 2021-05-26 RX ADMIN — OXYCODONE HYDROCHLORIDE 10 MILLIGRAM(S): 5 TABLET ORAL at 10:28

## 2021-05-26 RX ADMIN — Medication 4: at 17:06

## 2021-05-26 RX ADMIN — OXYCODONE HYDROCHLORIDE 10 MILLIGRAM(S): 5 TABLET ORAL at 21:45

## 2021-05-26 RX ADMIN — Medication 5 UNIT(S): at 08:41

## 2021-05-26 RX ADMIN — REMDESIVIR 540 MILLIGRAM(S): 5 INJECTION INTRAVENOUS at 21:47

## 2021-05-26 RX ADMIN — Medication 8: at 11:41

## 2021-05-26 RX ADMIN — FAMOTIDINE 20 MILLIGRAM(S): 10 INJECTION INTRAVENOUS at 21:45

## 2021-05-26 RX ADMIN — Medication 8: at 08:40

## 2021-05-26 RX ADMIN — LOSARTAN POTASSIUM 25 MILLIGRAM(S): 100 TABLET, FILM COATED ORAL at 10:29

## 2021-05-26 RX ADMIN — Medication 2: at 21:44

## 2021-05-26 RX ADMIN — INSULIN GLARGINE 35 UNIT(S): 100 INJECTION, SOLUTION SUBCUTANEOUS at 21:45

## 2021-05-26 RX ADMIN — ENOXAPARIN SODIUM 40 MILLIGRAM(S): 100 INJECTION SUBCUTANEOUS at 10:28

## 2021-05-26 RX ADMIN — Medication 6 MILLIGRAM(S): at 10:29

## 2021-05-26 RX ADMIN — SIMVASTATIN 20 MILLIGRAM(S): 20 TABLET, FILM COATED ORAL at 21:45

## 2021-05-26 RX ADMIN — OXYCODONE HYDROCHLORIDE 10 MILLIGRAM(S): 5 TABLET ORAL at 11:15

## 2021-05-26 RX ADMIN — Medication 81 MILLIGRAM(S): at 10:29

## 2021-05-26 NOTE — PROGRESS NOTE ADULT - ASSESSMENT
62 y/o male with PMHX of IDDM, HTN, HLD who presented to  with progressive weakness related to COVID-19 infection.  Spo2 was 93% in ER at rest-- patient now 94% on 3L NC.      #Acute Hypoxic Respiratory Failure secondary to COVID -19 PNA:    Maintain SP02 >90% - now on NC at 4 lpm     IV decadron 6 mg IV daily day 2   IV remdesivir daily day 2   ID consult appreciated   Trend inflammatory markers.  DDimer 300's.  Ferritin 1300.    daily CMP while on remdesivir   S/p monoclonal AB infusion on 5/24.      #Uncontrolled IDDM:    monitor BGM TID while on dexamethasone   Patient notes hasn't been eating/ has not taken insulin in 3 weeks.    Previously using Humalog 75/25 45 units BID.    cont lantus 20 qhs.   cont ademelog 5 TID with meals.    Sliding scale.    Check A1C.    No signs/ sx of DKA at present.      #Hyponatremia:    Suspect more pseudohyponatremia due to hyperglycemia.  's.    corrected Na 139 today     #HTN:    Cont home meds.      #HLD:    Cont home meds.      #Chronic Pain:    Home pain meds nonformulary.    Start oxycontin 10 BID.    Percocet PRN.      #DVT Proph:    Lovenox.         60 y/o male with PMHX of IDDM, HTN, HLD who presented to  with progressive weakness related to COVID-19 infection.  Spo2 was 93% in ER at rest-- patient now 94% on 3L NC.      #Acute Hypoxic Respiratory Failure secondary to COVID -19 PNA:    Maintain SP02 >90% - now on NC at 4 lpm     IV decadron 6 mg IV daily day 2   IV remdesivir daily day 2   ID consult appreciated   Trend inflammatory markers.  DDimer 300's.  Ferritin 1300.    daily CMP while on remdesivir   S/p monoclonal AB infusion on 5/24.      #Uncontrolled IDDM:    monitor BGM TID while on dexamethasone   Patient notes hasn't been eating/ has not taken insulin in 3 weeks.    Previously using Humalog 75/25 45 units BID.    cont lantus  30 qhs.   cont ademelog 5 TID with meals.    Sliding scale.    Check A1C.    No signs/ sx of DKA at present.      #Hyponatremia:    Suspect more pseudohyponatremia due to hyperglycemia.  's.    corrected Na 139 today     #HTN:    Cont home meds.      #HLD:    Cont home meds.      #Chronic Pain:    Home pain meds nonformulary.    Start oxycontin 10 BID.    Percocet PRN.      #DVT Proph:    Lovenox.

## 2021-05-26 NOTE — CONSULT NOTE ADULT - ASSESSMENT
60 y/o male with PMHX of IDDM, HTN, HLD who presented to  with progressive weakness related to COVID-19 infection.  Patient notes he first starting having symptoms last May 16th.  He had multiple negative tests initially but tested positive as of 5/19.  Patient notes several family members at home including his wife and grandson have also tested positive.  Patient states his main symptom has been fatigue/ weakness/ loss of appetite.  Patient notes no real fevers-- Tmax 99.  No significant coughing or SOB.  He has had loss of appetite/ nausea/ and some diarrhea with stool incontinence. 5/23 he presented to  ER and was given monoclonal antibodies and was told to return to the ER if sx were not improved.  Patient returned 5/25.  Spo2 was 93% in ER at rest-- patient now 94% on 3L NC.  Xray with small R infiltrate, was given remdesivir/decadron.     1. covid-19 viral syndrome/pneumonia  - agree with remdesivir/decadron #2   - continue with steroids/antiviral  - monitor renal/hepatic function closely on therapy  - observe off antibiotics  - fu cbc  - supportive care  - isolation precautions  - monitor temps  - f/u inflammatory markers    2. other issues - care per medicine

## 2021-05-26 NOTE — PROGRESS NOTE ADULT - SUBJECTIVE AND OBJECTIVE BOX
Patient is a 61y old  Male who presents with a chief complaint of weakness (26 May 2021 09:59)      SUBJECTIVE:   HPI:  60 y/o male with PMHX of IDDM, HTN, HLD who presented to  with progressive weakness related to COVID-19 infection.  Patient notes he first starting having symptoms last cony May 16th.  He had multiple negative tests initially but tested positive as of 5/19.  Patient notes several family members at home including his wife and grandson have also tested positive.  Patient states his main symptom has been fatigue/ weakness/ loss of appetite.  Patient notes no real fevers-- Tmax 99.  No significant coughing or SOB.  He has had loss of appetite/ nausea/ and some diarrhea with stool incontinence.  Yesterday, he presented to  ER and was given monoclonal antibodies and was told to return to the ER if sx were not improved.  Patient returns today.  Spo2 was 93% in ER at rest-- patient now 94% on 3L NC.      5/26/21: sitting up in bed. felt short of breath while walking to bathroom resolved after 4lpm NC. concerned about his blood sugars being elevated.     REVIEW OF SYSTEMS:    CONSTITUTIONAL: No weakness, fevers or chills  EYES/ENT: No visual changes;  No vertigo or throat pain   NECK: No pain or stiffness  RESPIRATORY: exertional dyspnea. slight cough. no wheezing, hemoptysis; No shortness of breath  CARDIOVASCULAR: No chest pain or palpitations  GASTROINTESTINAL: No abdominal or epigastric pain. No nausea, vomiting, or hematemesis; No diarrhea or constipation. No melena or hematochezia.  GENITOURINARY: No dysuria, frequency or hematuria  NEUROLOGICAL: No numbness or weakness  SKIN: No itching, burning, rashes, or lesions   All other review of systems is negative unless indicated above      Weight (kg): 87.4 (05-25 @ 19:20)    Vital Signs Last 24 Hrs  T(C): 36.5 (26 May 2021 08:07), Max: 37.2 (25 May 2021 19:00)  T(F): 97.7 (26 May 2021 08:07), Max: 98.9 (25 May 2021 19:00)  HR: 77 (26 May 2021 08:07) (77 - 83)  BP: 119/67 (26 May 2021 08:07) (110/69 - 134/73)  BP(mean): 90 (25 May 2021 16:00) (82 - 90)  RR: 18 (26 May 2021 08:07) (18 - 26)  SpO2: 94% (26 May 2021 08:07) (93% - 95%) --- 4lpm NC     CAPILLARY BLOOD GLUCOSE    POCT Blood Glucose.: 303 mg/dL (26 May 2021 11:39)  POCT Blood Glucose.: 338 mg/dL (26 May 2021 08:18)  POCT Blood Glucose.: 328 mg/dL (25 May 2021 22:42)  POCT Blood Glucose.: 332 mg/dL (25 May 2021 18:21)  POCT Blood Glucose.: 400 mg/dL (25 May 2021 15:32)      PHYSICAL EXAM:    Constitutional: NAD, awake and alert, well-developed  HEENT: PERR, EOMI, Normal Hearing, MMM  Neck: Soft and supple, No LAD, No JVD  Respiratory: Breath sounds are clear bilaterally, No wheezing, rales or rhonchi  Cardiovascular: S1 and S2, regular rate and rhythm, no Murmurs, gallops or rubs  Gastrointestinal: Bowel Sounds present, soft, nontender, nondistended, no guarding, no rebound  Extremities: No peripheral edema  Vascular: 2+ peripheral pulses  Neurological: A/O x 3, no focal deficits  Musculoskeletal: 5/5 strength b/l upper and lower extremities  Skin: No rashes    MEDICATIONS:  MEDICATIONS  (STANDING):  aspirin enteric coated 81 milliGRAM(s) Oral daily  dexAMETHasone  Injectable 6 milliGRAM(s) IV Push daily  dextrose 40% Gel 15 Gram(s) Oral once  dextrose 5%. 1000 milliLiter(s) (50 mL/Hr) IV Continuous <Continuous>  dextrose 5%. 1000 milliLiter(s) (100 mL/Hr) IV Continuous <Continuous>  dextrose 50% Injectable 25 Gram(s) IV Push once  dextrose 50% Injectable 12.5 Gram(s) IV Push once  dextrose 50% Injectable 25 Gram(s) IV Push once  enoxaparin Injectable 40 milliGRAM(s) SubCutaneous daily  famotidine    Tablet 20 milliGRAM(s) Oral at bedtime  glucagon  Injectable 1 milliGRAM(s) IntraMuscular once  insulin glargine Injectable (LANTUS) 20 Unit(s) SubCutaneous at bedtime  insulin lispro (ADMELOG) corrective regimen sliding scale   SubCutaneous three times a day before meals  insulin lispro (ADMELOG) corrective regimen sliding scale   SubCutaneous at bedtime  insulin lispro Injectable (ADMELOG) 5 Unit(s) SubCutaneous three times a day before meals  losartan 25 milliGRAM(s) Oral daily  metoprolol succinate  milliGRAM(s) Oral daily  oxyCODONE  ER Tablet 10 milliGRAM(s) Oral every 12 hours  remdesivir  IVPB 100 milliGRAM(s) IV Intermittent every 24 hours  remdesivir  IVPB   IV Intermittent   simvastatin 20 milliGRAM(s) Oral at bedtime      LABS: All Labs Reviewed:                        13.4   8.43  )-----------( 180      ( 26 May 2021 07:32 )             41.2     05-26    135  |  104  |  27<H>  ----------------------------<  329<H>  4.3   |  24  |  1.07    Ca    9.0      26 May 2021 07:32    TPro  6.9  /  Alb  2.4<L>  /  TBili  0.4  /  DBili  x   /  AST  28  /  ALT  21  /  AlkPhos  57  05-26    PT/INR - ( 26 May 2021 07:32 )   PT: 15.1 sec;   INR: 1.32 ratio           CARDIAC MARKERS ( 25 May 2021 11:43 )  <0.015 ng/mL / x     / x     / x     / x              Blood Culture:     RADIOLOGY/EKG:

## 2021-05-26 NOTE — CONSULT NOTE ADULT - SUBJECTIVE AND OBJECTIVE BOX
Patient is a 61y old  Male who presents with a chief complaint of weakness (25 May 2021 17:56)    HPI:  60 y/o male with PMHX of IDDM, HTN, HLD who presented to  with progressive weakness related to COVID-19 infection.  Patient notes he first starting having symptoms last May 16th.  He had multiple negative tests initially but tested positive as of 5/19.  Patient notes several family members at home including his wife and grandson have also tested positive.  Patient states his main symptom has been fatigue/ weakness/ loss of appetite.  Patient notes no real fevers-- Tmax 99.  No significant coughing or SOB.  He has had loss of appetite/ nausea/ and some diarrhea with stool incontinence. 5/23 he presented to  ER and was given monoclonal antibodies and was told to return to the ER if sx were not improved.  Patient returned 5/25.  Spo2 was 93% in ER at rest-- patient now 94% on 3L NC.  Xray with small R infiltrate, was given remdesivir/decadron.     PAST MEDICAL & SURGICAL HISTORY:  IDDM  HTN  HLD  Chronic pain    Meds: per reconciliation sheet, noted below  MEDICATIONS  (STANDING):  aspirin enteric coated 81 milliGRAM(s) Oral daily  dexAMETHasone  Injectable 6 milliGRAM(s) IV Push daily  dextrose 40% Gel 15 Gram(s) Oral once  dextrose 5%. 1000 milliLiter(s) (50 mL/Hr) IV Continuous <Continuous>  dextrose 5%. 1000 milliLiter(s) (100 mL/Hr) IV Continuous <Continuous>  dextrose 50% Injectable 25 Gram(s) IV Push once  dextrose 50% Injectable 12.5 Gram(s) IV Push once  dextrose 50% Injectable 25 Gram(s) IV Push once  enoxaparin Injectable 40 milliGRAM(s) SubCutaneous daily  famotidine    Tablet 20 milliGRAM(s) Oral at bedtime  glucagon  Injectable 1 milliGRAM(s) IntraMuscular once  insulin glargine Injectable (LANTUS) 20 Unit(s) SubCutaneous at bedtime  insulin lispro (ADMELOG) corrective regimen sliding scale   SubCutaneous three times a day before meals  insulin lispro (ADMELOG) corrective regimen sliding scale   SubCutaneous at bedtime  insulin lispro Injectable (ADMELOG) 5 Unit(s) SubCutaneous three times a day before meals  losartan 25 milliGRAM(s) Oral daily  metoprolol succinate  milliGRAM(s) Oral daily  oxyCODONE  ER Tablet 10 milliGRAM(s) Oral every 12 hours  remdesivir  IVPB 100 milliGRAM(s) IV Intermittent every 24 hours  remdesivir  IVPB   IV Intermittent   simvastatin 20 milliGRAM(s) Oral at bedtime      Allergies    No Known Allergies    Intolerances      Social: no smoking, no alcohol, no illegal drugs; no recent travel, no exposure to TB  FAMILY HISTORY:     no history of premature cardiovascular disease in first degree relatives    ROS: no HA, no dizziness, no sore throat, no blurry vision, no CP, no palpitations,  no abdominal pain, no diarrhea, no N/V, no dysuria, no leg pain, no claudication, no rash, no joint aches, no rectal pain or bleeding, no night sweats    All other systems reviewed and are negative    Vital Signs Last 24 Hrs  T(C): 36.5 (26 May 2021 08:07), Max: 37.3 (25 May 2021 11:09)  T(F): 97.7 (26 May 2021 08:07), Max: 99.1 (25 May 2021 11:09)  HR: 77 (26 May 2021 08:07) (77 - 100)  BP: 119/67 (26 May 2021 08:07) (110/69 - 134/73)  BP(mean): 90 (25 May 2021 16:00) (77 - 90)  RR: 18 (26 May 2021 08:07) (18 - 26)  SpO2: 94% (26 May 2021 08:07) (93% - 95%)  Daily Height in cm: 177.8 (25 May 2021 11:09)    Daily     PE:  Constitutional: frail looking  HEENT: NC/AT, EOMI, PERRLA, conjunctivae clear; ears and nose atraumatic; pharynx benign  Neck: supple; thyroid not palpable  Back: no tenderness  Respiratory: decreased breath sounds  Cardiovascular: S1S2 regular, no murmurs  Abdomen: soft, not tender, not distended, positive BS; liver and spleen WNL  Genitourinary: no suprapubic tenderness  Lymphatic: no LN palpable  Musculoskeletal: no muscle tenderness, no joint swelling or tenderness  Extremities: no pedal edema  Neurological/ Psychiatric: AxOx3, Judgement and insight normal;  moving all extremities  Skin: no rashes; no palpable lesions    Labs: all available labs reviewed                        13.4   8.43  )-----------( 180      ( 26 May 2021 07:32 )             41.2     05-26    135  |  104  |  27<H>  ----------------------------<  329<H>  4.3   |  24  |  1.07    Ca    9.0      26 May 2021 07:32    TPro  6.9  /  Alb  2.4<L>  /  TBili  0.4  /  DBili  x   /  AST  28  /  ALT  21  /  AlkPhos  57  05-26     LIVER FUNCTIONS - ( 26 May 2021 07:32 )  Alb: 2.4 g/dL / Pro: 6.9 gm/dL / ALK PHOS: 57 U/L / ALT: 21 U/L / AST: 28 U/L / GGT: x                 Radiology: all available radiological tests reviewed      EXAM:  XR CHEST PORTABLE URGENT 1V                            PROCEDURE DATE:  05/25/2021          INTERPRETATION:  AP chest on May 25, 2021 at 12:45 PM. Patient is short of breath with cough and fever.    Heart magnified by technique.    Small infiltrate off right upper hilum noted.    This infiltrate more visible than on May 23.    IMPRESSION: Small infiltrate off right upper hilum somewhat increased from prior.    < end of copied text >    Advanced directives addressed: full resuscitation

## 2021-05-27 ENCOUNTER — TRANSCRIPTION ENCOUNTER (OUTPATIENT)
Age: 61
End: 2021-05-27

## 2021-05-27 LAB
ALBUMIN SERPL ELPH-MCNC: 2.5 G/DL — LOW (ref 3.3–5)
ALP SERPL-CCNC: 62 U/L — SIGNIFICANT CHANGE UP (ref 40–120)
ALT FLD-CCNC: 19 U/L — SIGNIFICANT CHANGE UP (ref 12–78)
ANION GAP SERPL CALC-SCNC: 10 MMOL/L — SIGNIFICANT CHANGE UP (ref 5–17)
AST SERPL-CCNC: 21 U/L — SIGNIFICANT CHANGE UP (ref 15–37)
BILIRUB SERPL-MCNC: 0.4 MG/DL — SIGNIFICANT CHANGE UP (ref 0.2–1.2)
BUN SERPL-MCNC: 36 MG/DL — HIGH (ref 7–23)
CALCIUM SERPL-MCNC: 9.5 MG/DL — SIGNIFICANT CHANGE UP (ref 8.5–10.1)
CHLORIDE SERPL-SCNC: 106 MMOL/L — SIGNIFICANT CHANGE UP (ref 96–108)
CO2 SERPL-SCNC: 20 MMOL/L — LOW (ref 22–31)
CREAT SERPL-MCNC: 1.06 MG/DL — SIGNIFICANT CHANGE UP (ref 0.5–1.3)
CRP SERPL-MCNC: 78 MG/L — HIGH
D DIMER BLD IA.RAPID-MCNC: 276 NG/ML DDU — HIGH
FERRITIN SERPL-MCNC: 1682 NG/ML — HIGH (ref 30–400)
GLUCOSE SERPL-MCNC: 341 MG/DL — HIGH (ref 70–99)
INR BLD: 1.27 RATIO — HIGH (ref 0.88–1.16)
POTASSIUM SERPL-MCNC: 4.2 MMOL/L — SIGNIFICANT CHANGE UP (ref 3.5–5.3)
POTASSIUM SERPL-SCNC: 4.2 MMOL/L — SIGNIFICANT CHANGE UP (ref 3.5–5.3)
PROT SERPL-MCNC: 6.7 GM/DL — SIGNIFICANT CHANGE UP (ref 6–8.3)
PROTHROM AB SERPL-ACNC: 14.7 SEC — HIGH (ref 10.6–13.6)
SODIUM SERPL-SCNC: 136 MMOL/L — SIGNIFICANT CHANGE UP (ref 135–145)

## 2021-05-27 PROCEDURE — 99232 SBSQ HOSP IP/OBS MODERATE 35: CPT

## 2021-05-27 RX ADMIN — OXYCODONE HYDROCHLORIDE 10 MILLIGRAM(S): 5 TABLET ORAL at 22:23

## 2021-05-27 RX ADMIN — ENOXAPARIN SODIUM 40 MILLIGRAM(S): 100 INJECTION SUBCUTANEOUS at 10:09

## 2021-05-27 RX ADMIN — Medication 5 UNIT(S): at 07:55

## 2021-05-27 RX ADMIN — OXYCODONE HYDROCHLORIDE 10 MILLIGRAM(S): 5 TABLET ORAL at 10:08

## 2021-05-27 RX ADMIN — Medication 2: at 22:20

## 2021-05-27 RX ADMIN — SIMVASTATIN 20 MILLIGRAM(S): 20 TABLET, FILM COATED ORAL at 22:21

## 2021-05-27 RX ADMIN — Medication 81 MILLIGRAM(S): at 10:08

## 2021-05-27 RX ADMIN — REMDESIVIR 540 MILLIGRAM(S): 5 INJECTION INTRAVENOUS at 22:24

## 2021-05-27 RX ADMIN — INSULIN GLARGINE 35 UNIT(S): 100 INJECTION, SOLUTION SUBCUTANEOUS at 22:20

## 2021-05-27 RX ADMIN — Medication 6 MILLIGRAM(S): at 10:08

## 2021-05-27 RX ADMIN — OXYCODONE HYDROCHLORIDE 10 MILLIGRAM(S): 5 TABLET ORAL at 22:28

## 2021-05-27 RX ADMIN — Medication 10: at 11:56

## 2021-05-27 RX ADMIN — FAMOTIDINE 20 MILLIGRAM(S): 10 INJECTION INTRAVENOUS at 22:20

## 2021-05-27 RX ADMIN — LOSARTAN POTASSIUM 25 MILLIGRAM(S): 100 TABLET, FILM COATED ORAL at 10:09

## 2021-05-27 RX ADMIN — Medication 5 UNIT(S): at 11:56

## 2021-05-27 RX ADMIN — Medication 5 UNIT(S): at 16:57

## 2021-05-27 RX ADMIN — Medication 0.25 MILLIGRAM(S): at 11:50

## 2021-05-27 RX ADMIN — Medication 100 MILLIGRAM(S): at 10:09

## 2021-05-27 RX ADMIN — Medication 8: at 07:55

## 2021-05-27 RX ADMIN — Medication 6: at 16:57

## 2021-05-27 NOTE — PROGRESS NOTE ADULT - ASSESSMENT
62 y/o male with PMHX of IDDM, HTN, HLD who presented to  with progressive weakness related to COVID-19 infection.  Patient notes he first starting having symptoms last May 16th.  He had multiple negative tests initially but tested positive as of 5/19.  Patient notes several family members at home including his wife and grandson have also tested positive.  Patient states his main symptom has been fatigue/ weakness/ loss of appetite.  Patient notes no real fevers-- Tmax 99.  No significant coughing or SOB.  He has had loss of appetite/ nausea/ and some diarrhea with stool incontinence. 5/23 he presented to  ER and was given monoclonal antibodies and was told to return to the ER if sx were not improved.  Patient returned 5/25.  Spo2 was 93% in ER at rest-- patient now 94% on 3L NC.  Xray with small R infiltrate, was given remdesivir/decadron.     1. covid-19 viral syndrome/pneumonia  - on remdesivir/decadron #3  - continue with steroids/antiviral  - monitor renal/hepatic function closely on therapy  - observe off antibiotics  - fu cbc  - supportive care  - isolation precautions  - monitor temps  - f/u inflammatory markers    2. other issues - care per medicine

## 2021-05-27 NOTE — PROGRESS NOTE ADULT - ASSESSMENT
62 y/o male with PMHX of IDDM, HTN, HLD who presented to  with progressive weakness related to COVID-19 infection.  Spo2 was 93% in ER at rest-- patient now 94% on 3L NC.      #Acute Hypoxic Respiratory Failure and acute metabolic encephalopathy secondary to COVID -19 PNA   Maintain SP02 >90% - now on NC at 4 lpm     confusion likely secondary to episodes of hypoxia   IV decadron 6 mg IV daily day 3   IV remdesivir daily day 3  ID consult appreciated   Trend inflammatory markers.  DDimer 300's.  Ferritin 1300. (due to shortage of blue top blood tubes, ddimers will be ordered sparingly at this time)     daily CMP while on remdesivir   S/p monoclonal AB infusion on 5/24.    cont supportive care   AM abg     #Uncontrolled IDDM:    monitor BGM TID while on dexamethasone   Patient notes hasn't been eating/ has not taken insulin in 3 weeks.    Previously using Humalog 75/25 45 units BID.    cont lantus  35 qhs.   cont ademelog 5 TID with meals.    Sliding scale.    a1c 9.6    AM abg to eval for possible DKA       #pseudoHyponatremia:    due to hyperglycemia.  BGM 300s - corrected Na 140    #HTN:    Cont home meds.      #HLD:    Cont home meds.      #Chronic Pain:    Home pain meds nonformulary.    Start oxycontin 10 BID.    Percocet PRN.      #DVT Proph:    Lovenox.         60 y/o male with PMHX of IDDM, HTN, HLD who presented to  with progressive weakness related to COVID-19 infection.  Spo2 was 93% in ER at rest-- patient now 94% on 3L NC.      #Acute Hypoxic Respiratory Failure / acute metabolic encephalopathy secondary to COVID -19 PNA   Maintain SP02 >90% - now on NC at 4 lpm     confusion likely secondary to episodes of hypoxia   IV decadron 6 mg IV daily day 3   IV remdesivir daily day 3  ID consult appreciated   DDimer 300'--> 276.  Ferritin 1300--> 1600s.   daily CMP while on remdesivir   S/p monoclonal AB infusion on 5/24.    cont supportive care   abg pending     #Uncontrolled IDDM:    monitor BGM TID while on dexamethasone   Patient notes hasn't been eating/ has not taken insulin in 3 weeks.    Previously using Humalog 75/25 45 units BID.    cont lantus  35 qhs.   cont ademelog 5 TID with meals.    Sliding scale.    a1c 9.6    abg pending        #pseudoHyponatremia:    due to hyperglycemia.  BGM 300s - corrected Na 140    #HTN:    Cont home meds.      #HLD:    Cont home meds.      #Chronic Pain:    Home pain meds nonformulary.    Start oxycontin 10 BID.    Percocet PRN.      #DVT Proph:    Lovenox.

## 2021-05-27 NOTE — PROGRESS NOTE ADULT - SUBJECTIVE AND OBJECTIVE BOX
Patient is a 61y old  Male who presents with a chief complaint of weakness (26 May 2021 09:59)      SUBJECTIVE:   HPI:  60 y/o male with PMHX of IDDM, HTN, HLD who presented to  with progressive weakness related to COVID-19 infection.  Patient notes he first starting having symptoms last cony May 16th.  He had multiple negative tests initially but tested positive as of 5/19.  Patient notes several family members at home including his wife and grandson have also tested positive.  Patient states his main symptom has been fatigue/ weakness/ loss of appetite.  Patient notes no real fevers-- Tmax 99.  No significant coughing or SOB.  He has had loss of appetite/ nausea/ and some diarrhea with stool incontinence.  Yesterday, he presented to  ER and was given monoclonal antibodies and was told to return to the ER if sx were not improved.  Patient returns today.  Spo2 was 93% in ER at rest-- patient now 94% on 3L NC.      5/26/21: sitting up in bed. felt short of breath while walking to bathroom resolved after 4lpm NC. concerned about his blood sugars being elevated.   5/27/21: sitting up today. slightly confused, reports seeing things on his body and in his room.     REVIEW OF SYSTEMS:    CONSTITUTIONAL: No weakness, fevers or chills  EYES/ENT: No visual changes;  No vertigo or throat pain   NECK: No pain or stiffness  RESPIRATORY: exertional dyspnea. slight cough. no wheezing, hemoptysis; No shortness of breath  CARDIOVASCULAR: No chest pain or palpitations  GASTROINTESTINAL: No abdominal or epigastric pain. No nausea, vomiting, or hematemesis; No diarrhea or constipation. No melena or hematochezia.  GENITOURINARY: No dysuria, frequency or hematuria  NEUROLOGICAL: No numbness or weakness  SKIN: No itching, burning, rashes, or lesions   All other review of systems is negative unless indicated above      Weight (kg): 87.4 (05-25 @ 19:20)    Vital Signs Last 24 Hrs  T(C): 36.6 (27 May 2021 09:04), Max: 36.6 (26 May 2021 15:49)  T(F): 97.8 (27 May 2021 09:04), Max: 97.9 (26 May 2021 15:49)  HR: 65 (27 May 2021 09:04) (65 - 76)  BP: 118/56 (27 May 2021 09:04) (98/61 - 118/56)  BP(mean): --  RR: 18 (27 May 2021 12:22) (18 - 19)  SpO2: 92% (27 May 2021 12:22) (88% - 96%)    CAPILLARY BLOOD GLUCOSE  POCT Blood Glucose.: 353 mg/dL (27 May 2021 11:53)  POCT Blood Glucose.: 337 mg/dL (27 May 2021 07:52)  POCT Blood Glucose.: 285 mg/dL (26 May 2021 21:26)  POCT Blood Glucose.: 230 mg/dL (26 May 2021 17:04)    PHYSICAL EXAM:    Constitutional: NAD, awake and alert, well-developed  HEENT: PERR, EOMI, Normal Hearing, MMM  Neck: Soft and supple, No LAD, No JVD  Respiratory: Breath sounds are clear bilaterally, No wheezing, rales or rhonchi  Cardiovascular: S1 and S2, regular rate and rhythm, no Murmurs, gallops or rubs  Gastrointestinal: Bowel Sounds present, soft, nontender, nondistended, no guarding, no rebound  Extremities: No peripheral edema  Vascular: 2+ peripheral pulses  Neurological: A/O x 3, no focal deficits. intermittent confusion   Musculoskeletal: 5/5 strength b/l upper and lower extremities  Skin: flushed face. No rashes    MEDICATIONS:  MEDICATIONS  (STANDING):  aspirin enteric coated 81 milliGRAM(s) Oral daily  dexAMETHasone  Injectable 6 milliGRAM(s) IV Push daily  dextrose 40% Gel 15 Gram(s) Oral once  dextrose 5%. 1000 milliLiter(s) (50 mL/Hr) IV Continuous <Continuous>  dextrose 5%. 1000 milliLiter(s) (100 mL/Hr) IV Continuous <Continuous>  dextrose 50% Injectable 25 Gram(s) IV Push once  dextrose 50% Injectable 12.5 Gram(s) IV Push once  dextrose 50% Injectable 25 Gram(s) IV Push once  enoxaparin Injectable 40 milliGRAM(s) SubCutaneous daily  famotidine    Tablet 20 milliGRAM(s) Oral at bedtime  glucagon  Injectable 1 milliGRAM(s) IntraMuscular once  insulin glargine Injectable (LANTUS) 20 Unit(s) SubCutaneous at bedtime  insulin lispro (ADMELOG) corrective regimen sliding scale   SubCutaneous three times a day before meals  insulin lispro (ADMELOG) corrective regimen sliding scale   SubCutaneous at bedtime  insulin lispro Injectable (ADMELOG) 5 Unit(s) SubCutaneous three times a day before meals  losartan 25 milliGRAM(s) Oral daily  metoprolol succinate  milliGRAM(s) Oral daily  oxyCODONE  ER Tablet 10 milliGRAM(s) Oral every 12 hours  remdesivir  IVPB 100 milliGRAM(s) IV Intermittent every 24 hours  remdesivir  IVPB   IV Intermittent   simvastatin 20 milliGRAM(s) Oral at bedtime      LABS: All Labs Reviewed:                     13.4   8.43  )-----------( 180      ( 26 May 2021 07:32 )             41.2     05-26    135  |  104  |  27<H>  ----------------------------<  329<H>  4.3   |  24  |  1.07    Ca    9.0      26 May 2021 07:32    TPro  6.9  /  Alb  2.4<L>  /  TBili  0.4  /  DBili  x   /  AST  28  /  ALT  21  /  AlkPhos  57  05-26    PT/INR - ( 26 May 2021 07:32 )   PT: 15.1 sec;   INR: 1.32 ratio        CARDIAC MARKERS ( 25 May 2021 11:43 )  <0.015 ng/mL / x     / x     / x     / x        Blood Culture:     RADIOLOGY/EKG:  < from: Xray Chest 1 View- PORTABLE-Urgent (05.25.21 @ 12:53) >    IMPRESSION: Small infiltrate off right upper hilum somewhat increased from prior.      NABILA BROWNE MD; Attending Radiologist  This document has been electronically signed. May 25 2021  2:39PM    < end of copied text >           Patient is a 61y old  Male who presents with a chief complaint of weakness (26 May 2021 09:59)      SUBJECTIVE:   HPI:  62 y/o male with PMHX of IDDM, HTN, HLD who presented to  with progressive weakness related to COVID-19 infection.  Patient notes he first starting having symptoms last cony May 16th.  He had multiple negative tests initially but tested positive as of 5/19.  Patient notes several family members at home including his wife and grandson have also tested positive.  Patient states his main symptom has been fatigue/ weakness/ loss of appetite.  Patient notes no real fevers-- Tmax 99.  No significant coughing or SOB.  He has had loss of appetite/ nausea/ and some diarrhea with stool incontinence.  Yesterday, he presented to  ER and was given monoclonal antibodies and was told to return to the ER if sx were not improved.  Patient returns today.  Spo2 was 93% in ER at rest-- patient now 94% on 3L NC.      5/26/21: sitting up in bed. felt short of breath while walking to bathroom resolved after 4lpm NC. concerned about his blood sugars being elevated.   5/27/21: sitting up today. slightly confused, reports seeing things on his body and in his room.     REVIEW OF SYSTEMS:    CONSTITUTIONAL: No weakness, fevers or chills  EYES/ENT: No visual changes;  No vertigo or throat pain   NECK: No pain or stiffness  RESPIRATORY: exertional dyspnea. slight cough. no wheezing, hemoptysis; No shortness of breath  CARDIOVASCULAR: No chest pain or palpitations  GASTROINTESTINAL: No abdominal or epigastric pain. No nausea, vomiting, or hematemesis; No diarrhea or constipation. No melena or hematochezia.  GENITOURINARY: No dysuria, frequency or hematuria  NEUROLOGICAL: No numbness or weakness  SKIN: No itching, burning, rashes, or lesions   All other review of systems is negative unless indicated above    Weight (kg): 87.4 (05-25 @ 19:20)    Vital Signs Last 24 Hrs  T(C): 36.6 (27 May 2021 09:04), Max: 36.6 (26 May 2021 15:49)  T(F): 97.8 (27 May 2021 09:04), Max: 97.9 (26 May 2021 15:49)  HR: 65 (27 May 2021 09:04) (65 - 76)  BP: 118/56 (27 May 2021 09:04) (98/61 - 118/56)  BP(mean): --  RR: 18 (27 May 2021 12:22) (18 - 19)  SpO2: 92% (27 May 2021 12:22) (88% - 96%)    CAPILLARY BLOOD GLUCOSE  POCT Blood Glucose.: 353 mg/dL (27 May 2021 11:53)  POCT Blood Glucose.: 337 mg/dL (27 May 2021 07:52)  POCT Blood Glucose.: 285 mg/dL (26 May 2021 21:26)  POCT Blood Glucose.: 230 mg/dL (26 May 2021 17:04)    PHYSICAL EXAM:    Constitutional: NAD, awake and alert, well-developed  HEENT: PERR, EOMI, Normal Hearing, MMM  Neck: Soft and supple, No LAD, No JVD  Respiratory: Breath sounds are clear bilaterally, No wheezing, rales or rhonchi  Cardiovascular: S1 and S2, regular rate and rhythm, no Murmurs, gallops or rubs  Gastrointestinal: Bowel Sounds present, soft, nontender, nondistended, no guarding, no rebound  Extremities: No peripheral edema  Vascular: 2+ peripheral pulses  Neurological: A/O x 3, no focal deficits. intermittent confusion   Musculoskeletal: 5/5 strength b/l upper and lower extremities  Skin: flushed face. No rashes    MEDICATIONS:  MEDICATIONS  (STANDING):  aspirin enteric coated 81 milliGRAM(s) Oral daily  dexAMETHasone  Injectable 6 milliGRAM(s) IV Push daily  dextrose 40% Gel 15 Gram(s) Oral once  dextrose 5%. 1000 milliLiter(s) (50 mL/Hr) IV Continuous <Continuous>  dextrose 5%. 1000 milliLiter(s) (100 mL/Hr) IV Continuous <Continuous>  dextrose 50% Injectable 25 Gram(s) IV Push once  dextrose 50% Injectable 12.5 Gram(s) IV Push once  dextrose 50% Injectable 25 Gram(s) IV Push once  enoxaparin Injectable 40 milliGRAM(s) SubCutaneous daily  famotidine    Tablet 20 milliGRAM(s) Oral at bedtime  glucagon  Injectable 1 milliGRAM(s) IntraMuscular once  insulin glargine Injectable (LANTUS) 20 Unit(s) SubCutaneous at bedtime  insulin lispro (ADMELOG) corrective regimen sliding scale   SubCutaneous three times a day before meals  insulin lispro (ADMELOG) corrective regimen sliding scale   SubCutaneous at bedtime  insulin lispro Injectable (ADMELOG) 5 Unit(s) SubCutaneous three times a day before meals  losartan 25 milliGRAM(s) Oral daily  metoprolol succinate  milliGRAM(s) Oral daily  oxyCODONE  ER Tablet 10 milliGRAM(s) Oral every 12 hours  remdesivir  IVPB 100 milliGRAM(s) IV Intermittent every 24 hours  remdesivir  IVPB   IV Intermittent   simvastatin 20 milliGRAM(s) Oral at bedtime      LABS: All Labs Reviewed:                     13.4   8.43  )-----------( 180      ( 26 May 2021 07:32 )             41.2     05-26    135  |  104  |  27<H>  ----------------------------<  329<H>  4.3   |  24  |  1.07    Ca    9.0      26 May 2021 07:32    TPro  6.9  /  Alb  2.4<L>  /  TBili  0.4  /  DBili  x   /  AST  28  /  ALT  21  /  AlkPhos  57  05-26    PT/INR - ( 26 May 2021 07:32 )   PT: 15.1 sec;   INR: 1.32 ratio        CARDIAC MARKERS ( 25 May 2021 11:43 )  <0.015 ng/mL / x     / x     / x     / x        Blood Culture:     RADIOLOGY/EKG:  < from: Xray Chest 1 View- PORTABLE-Urgent (05.25.21 @ 12:53) >    IMPRESSION: Small infiltrate off right upper hilum somewhat increased from prior.      NABILA BROWNE MD; Attending Radiologist  This document has been electronically signed. May 25 2021  2:39PM    < end of copied text >

## 2021-05-27 NOTE — PROGRESS NOTE ADULT - SUBJECTIVE AND OBJECTIVE BOX
Date of service: 05-27-21 @ 10:52    pt seen and examined  on 4L NC o2 sats > 90  off oxygen, desaturates  has cough  afebrile    ROS: no fever or chills; denies dizziness, no HA,  no abdominal pain, no diarrhea or constipation; no dysuria, no urinary frequency, no legs pain, no rashes    MEDICATIONS  (STANDING):  aspirin enteric coated 81 milliGRAM(s) Oral daily  dexAMETHasone  Injectable 6 milliGRAM(s) IV Push daily  dextrose 40% Gel 15 Gram(s) Oral once  dextrose 5%. 1000 milliLiter(s) (50 mL/Hr) IV Continuous <Continuous>  dextrose 5%. 1000 milliLiter(s) (100 mL/Hr) IV Continuous <Continuous>  dextrose 50% Injectable 25 Gram(s) IV Push once  dextrose 50% Injectable 12.5 Gram(s) IV Push once  dextrose 50% Injectable 25 Gram(s) IV Push once  enoxaparin Injectable 40 milliGRAM(s) SubCutaneous daily  famotidine    Tablet 20 milliGRAM(s) Oral at bedtime  glucagon  Injectable 1 milliGRAM(s) IntraMuscular once  insulin glargine Injectable (LANTUS) 35 Unit(s) SubCutaneous at bedtime  insulin lispro (ADMELOG) corrective regimen sliding scale   SubCutaneous three times a day before meals  insulin lispro (ADMELOG) corrective regimen sliding scale   SubCutaneous at bedtime  insulin lispro Injectable (ADMELOG) 5 Unit(s) SubCutaneous three times a day before meals  losartan 25 milliGRAM(s) Oral daily  metoprolol succinate  milliGRAM(s) Oral daily  oxyCODONE  ER Tablet 10 milliGRAM(s) Oral every 12 hours  remdesivir  IVPB 100 milliGRAM(s) IV Intermittent every 24 hours  remdesivir  IVPB   IV Intermittent   simvastatin 20 milliGRAM(s) Oral at bedtime    Vital Signs Last 24 Hrs  T(C): 36.6 (27 May 2021 09:04), Max: 36.6 (26 May 2021 15:49)  T(F): 97.8 (27 May 2021 09:04), Max: 97.9 (26 May 2021 15:49)  HR: 65 (27 May 2021 09:04) (65 - 76)  BP: 118/56 (27 May 2021 09:04) (98/61 - 118/56)  BP(mean): --  RR: 18 (27 May 2021 09:04) (18 - 19)  SpO2: 96% (27 May 2021 09:04) (93% - 96%)      PE:  Constitutional: frail looking  HEENT: NC/AT, EOMI, PERRLA, conjunctivae clear; ears and nose atraumatic; pharynx benign  Neck: supple; thyroid not palpable  Back: no tenderness  Respiratory: decreased breath sounds  Cardiovascular: S1S2 regular, no murmurs  Abdomen: soft, not tender, not distended, positive BS; liver and spleen WNL  Genitourinary: no suprapubic tenderness  Lymphatic: no LN palpable  Musculoskeletal: no muscle tenderness, no joint swelling or tenderness  Extremities: no pedal edema  Neurological/ Psychiatric: AxOx3, Judgement and insight normal;  moving all extremities  Skin: no rashes; no palpable lesions    Labs: all available labs reviewed                        13.4   8.43  )-----------( 180      ( 26 May 2021 07:32 )             41.2     05-27    136  |  106  |  36<H>  ----------------------------<  341<H>  4.2   |  20<L>  |  1.06    Ca    9.5      27 May 2021 08:03    TPro  6.7  /  Alb  2.5<L>  /  TBili  0.4  /  DBili  0.1  /  AST  21  /  ALT  19  /  AlkPhos  62  05-27             D-Dimer Assay, Quantitative: 276 ng/mL DDU (05-27-21 @ 08:03)  C-Reactive Protein, Serum: 174 mg/L (05-25-21 @ 11:43)  D-Dimer Assay, Quantitative: 331 ng/mL DDU (05-25-21 @ 11:43)  Ferritin, Serum: 1310 ng/mL (05-25-21 @ 11:43)        Radiology: all available radiological tests reviewed      EXAM:  XR CHEST PORTABLE URGENT 1V                            PROCEDURE DATE:  05/25/2021          INTERPRETATION:  AP chest on May 25, 2021 at 12:45 PM. Patient is short of breath with cough and fever.    Heart magnified by technique.    Small infiltrate off right upper hilum noted.    This infiltrate more visible than on May 23.    IMPRESSION: Small infiltrate off right upper hilum somewhat increased from prior.    < end of copied text >    Advanced directives addressed: full resuscitation

## 2021-05-28 LAB
ALBUMIN SERPL ELPH-MCNC: 2.5 G/DL — LOW (ref 3.3–5)
ALP SERPL-CCNC: 61 U/L — SIGNIFICANT CHANGE UP (ref 40–120)
ALT FLD-CCNC: 19 U/L — SIGNIFICANT CHANGE UP (ref 12–78)
ANION GAP SERPL CALC-SCNC: 8 MMOL/L — SIGNIFICANT CHANGE UP (ref 5–17)
AST SERPL-CCNC: 16 U/L — SIGNIFICANT CHANGE UP (ref 15–37)
BASE EXCESS BLDA CALC-SCNC: -0.8 MMOL/L — SIGNIFICANT CHANGE UP (ref -2–2)
BILIRUB SERPL-MCNC: 0.5 MG/DL — SIGNIFICANT CHANGE UP (ref 0.2–1.2)
BLOOD GAS COMMENTS ARTERIAL: SIGNIFICANT CHANGE UP
BUN SERPL-MCNC: 31 MG/DL — HIGH (ref 7–23)
CALCIUM SERPL-MCNC: 9.6 MG/DL — SIGNIFICANT CHANGE UP (ref 8.5–10.1)
CHLORIDE SERPL-SCNC: 107 MMOL/L — SIGNIFICANT CHANGE UP (ref 96–108)
CO2 SERPL-SCNC: 27 MMOL/L — SIGNIFICANT CHANGE UP (ref 22–31)
CREAT SERPL-MCNC: 0.98 MG/DL — SIGNIFICANT CHANGE UP (ref 0.5–1.3)
GAS PNL BLDA: SIGNIFICANT CHANGE UP
GLUCOSE SERPL-MCNC: 315 MG/DL — HIGH (ref 70–99)
HCO3 BLDA-SCNC: 23 MMOL/L — SIGNIFICANT CHANGE UP (ref 21–29)
INR BLD: 1.23 RATIO — HIGH (ref 0.88–1.16)
PCO2 BLDA: 35 MMHG — SIGNIFICANT CHANGE UP (ref 32–46)
PH BLDA: 7.42 — SIGNIFICANT CHANGE UP (ref 7.35–7.45)
PO2 BLDA: 63 MMHG — LOW (ref 74–108)
POTASSIUM SERPL-MCNC: 4 MMOL/L — SIGNIFICANT CHANGE UP (ref 3.5–5.3)
POTASSIUM SERPL-SCNC: 4 MMOL/L — SIGNIFICANT CHANGE UP (ref 3.5–5.3)
PROT SERPL-MCNC: 6.7 GM/DL — SIGNIFICANT CHANGE UP (ref 6–8.3)
PROTHROM AB SERPL-ACNC: 14.1 SEC — HIGH (ref 10.6–13.6)
SAO2 % BLDA: 91 % — LOW (ref 92–96)
SODIUM SERPL-SCNC: 142 MMOL/L — SIGNIFICANT CHANGE UP (ref 135–145)

## 2021-05-28 PROCEDURE — 99233 SBSQ HOSP IP/OBS HIGH 50: CPT

## 2021-05-28 RX ADMIN — SIMVASTATIN 20 MILLIGRAM(S): 20 TABLET, FILM COATED ORAL at 22:14

## 2021-05-28 RX ADMIN — Medication 8 UNIT(S): at 18:08

## 2021-05-28 RX ADMIN — Medication 81 MILLIGRAM(S): at 09:05

## 2021-05-28 RX ADMIN — OXYCODONE HYDROCHLORIDE 10 MILLIGRAM(S): 5 TABLET ORAL at 09:05

## 2021-05-28 RX ADMIN — Medication 10: at 12:26

## 2021-05-28 RX ADMIN — Medication 8 UNIT(S): at 12:26

## 2021-05-28 RX ADMIN — Medication 8: at 18:09

## 2021-05-28 RX ADMIN — ENOXAPARIN SODIUM 40 MILLIGRAM(S): 100 INJECTION SUBCUTANEOUS at 09:05

## 2021-05-28 RX ADMIN — FAMOTIDINE 20 MILLIGRAM(S): 10 INJECTION INTRAVENOUS at 22:13

## 2021-05-28 RX ADMIN — Medication 2: at 22:14

## 2021-05-28 RX ADMIN — Medication 6 MILLIGRAM(S): at 09:04

## 2021-05-28 RX ADMIN — REMDESIVIR 540 MILLIGRAM(S): 5 INJECTION INTRAVENOUS at 22:22

## 2021-05-28 RX ADMIN — Medication 5 UNIT(S): at 09:23

## 2021-05-28 RX ADMIN — OXYCODONE HYDROCHLORIDE 10 MILLIGRAM(S): 5 TABLET ORAL at 22:22

## 2021-05-28 RX ADMIN — LOSARTAN POTASSIUM 25 MILLIGRAM(S): 100 TABLET, FILM COATED ORAL at 09:05

## 2021-05-28 RX ADMIN — Medication 100 MILLIGRAM(S): at 09:05

## 2021-05-28 RX ADMIN — Medication 8: at 09:23

## 2021-05-28 RX ADMIN — INSULIN GLARGINE 45 UNIT(S): 100 INJECTION, SOLUTION SUBCUTANEOUS at 22:14

## 2021-05-28 NOTE — PROGRESS NOTE ADULT - SUBJECTIVE AND OBJECTIVE BOX
Date of service: 05-27-21 @ 10:52    pt seen and examined  desaturates off o2  on 6L NC   has cough  sitting up in bed  afebrile      ROS: no fever or chills; denies dizziness, no HA,  no abdominal pain, no diarrhea or constipation; no dysuria, no urinary frequency, no legs pain, no rashes    MEDICATIONS  (STANDING):  aspirin enteric coated 81 milliGRAM(s) Oral daily  dexAMETHasone  Injectable 6 milliGRAM(s) IV Push daily  dextrose 40% Gel 15 Gram(s) Oral once  dextrose 5%. 1000 milliLiter(s) (50 mL/Hr) IV Continuous <Continuous>  dextrose 5%. 1000 milliLiter(s) (100 mL/Hr) IV Continuous <Continuous>  dextrose 50% Injectable 25 Gram(s) IV Push once  dextrose 50% Injectable 12.5 Gram(s) IV Push once  dextrose 50% Injectable 25 Gram(s) IV Push once  enoxaparin Injectable 40 milliGRAM(s) SubCutaneous daily  famotidine    Tablet 20 milliGRAM(s) Oral at bedtime  glucagon  Injectable 1 milliGRAM(s) IntraMuscular once  insulin glargine Injectable (LANTUS) 35 Unit(s) SubCutaneous at bedtime  insulin lispro (ADMELOG) corrective regimen sliding scale   SubCutaneous three times a day before meals  insulin lispro (ADMELOG) corrective regimen sliding scale   SubCutaneous at bedtime  insulin lispro Injectable (ADMELOG) 5 Unit(s) SubCutaneous three times a day before meals  losartan 25 milliGRAM(s) Oral daily  metoprolol succinate  milliGRAM(s) Oral daily  oxyCODONE  ER Tablet 10 milliGRAM(s) Oral every 12 hours  remdesivir  IVPB 100 milliGRAM(s) IV Intermittent every 24 hours  remdesivir  IVPB   IV Intermittent   simvastatin 20 milliGRAM(s) Oral at bedtime    Vital Signs Last 24 Hrs  T(C): 36.6 (27 May 2021 09:04), Max: 36.6 (26 May 2021 15:49)  T(F): 97.8 (27 May 2021 09:04), Max: 97.9 (26 May 2021 15:49)  HR: 65 (27 May 2021 09:04) (65 - 76)  BP: 118/56 (27 May 2021 09:04) (98/61 - 118/56)  BP(mean): --  RR: 18 (27 May 2021 09:04) (18 - 19)  SpO2: 96% (27 May 2021 09:04) (93% - 96%)      PE:  Constitutional: frail looking  HEENT: NC/AT, EOMI, PERRLA, conjunctivae clear; ears and nose atraumatic; pharynx benign  Neck: supple; thyroid not palpable  Back: no tenderness  Respiratory: decreased breath sounds  Cardiovascular: S1S2 regular, no murmurs  Abdomen: soft, not tender, not distended, positive BS; liver and spleen WNL  Genitourinary: no suprapubic tenderness  Lymphatic: no LN palpable  Musculoskeletal: no muscle tenderness, no joint swelling or tenderness  Extremities: no pedal edema  Neurological/ Psychiatric: AxOx3, Judgement and insight normal;  moving all extremities  Skin: no rashes; no palpable lesions    Labs: all available labs reviewed               05-28    142  |  107  |  31<H>  ----------------------------<  315<H>  4.0   |  27  |  0.98    Ca    9.6      28 May 2021 08:22    TPro  6.7  /  Alb  2.5<L>  /  TBili  0.5  /  DBili  x   /  AST  16  /  ALT  19  /  AlkPhos  61  05-28          Ferritin, Serum: 1682 ng/mL (05-27-21 @ 08:03)  C-Reactive Protein, Serum: 78 mg/L (05-27-21 @ 08:03)  D-Dimer Assay, Quantitative: 276 ng/mL DDU (05-27-21 @ 08:03)  C-Reactive Protein, Serum: 174 mg/L (05-25-21 @ 11:43)  D-Dimer Assay, Quantitative: 331 ng/mL DDU (05-25-21 @ 11:43)  Ferritin, Serum: 1310 ng/mL (05-25-21 @ 11:43)        Radiology: all available radiological tests reviewed      EXAM:  XR CHEST PORTABLE URGENT 1V                            PROCEDURE DATE:  05/25/2021          INTERPRETATION:  AP chest on May 25, 2021 at 12:45 PM. Patient is short of breath with cough and fever.    Heart magnified by technique.    Small infiltrate off right upper hilum noted.    This infiltrate more visible than on May 23.    IMPRESSION: Small infiltrate off right upper hilum somewhat increased from prior.    < end of copied text >    Advanced directives addressed: full resuscitation

## 2021-05-28 NOTE — PROGRESS NOTE ADULT - ASSESSMENT
62 y/o male with PMHX of IDDM, HTN, HLD who presented to  with progressive weakness related to COVID-19 infection.  Spo2 was 93% in ER at rest-- patient now 94% on 3L NC.      #Acute Hypoxic Respiratory Failure / acute metabolic encephalopathy secondary to COVID -19 PNA   Maintain SP02 >90% - now on NC at 4 lpm     confusion likely secondary to episodes of hypoxia   IV decadron 6 mg IV daily day 3   IV remdesivir daily day 3  ID consult appreciated   DDimer 300'--> 276.  Ferritin 1300--> 1600s.   daily CMP while on remdesivir   S/p monoclonal AB infusion on 5/24.    cont supportive care   abg pending     #Uncontrolled IDDM:    monitor BGM TID while on dexamethasone   Patient notes hasn't been eating/ has not taken insulin in 3 weeks.    Previously using Humalog 75/25 45 units BID.    increased lantus to 45 units   increased ademelog 8 TID with meals.    Sliding scale.    a1c 9.6    abg pending        #pseudoHyponatremia:    due to hyperglycemia.  BGM 300s - corrected Na 140    #HTN:    Cont home meds.      #HLD:    Cont home meds.      #Chronic Pain:    Home pain meds nonformulary.    Start oxycontin 10 BID.    Percocet PRN.      #DVT Proph:    Lovenox.         62 y/o male with PMHX of IDDM, HTN, HLD who presented to  with progressive weakness related to COVID-19 infection.  Spo2 was 93% in ER at rest-- patient now 94% on 3L NC.      #Acute Hypoxic Respiratory Failure / acute metabolic encephalopathy secondary to COVID -19 PNA   Maintain SP02 >90% - now on NC at 4 lpm     confusion likely secondary to episodes of hypoxia   IV decadron 6 mg IV daily day 3   IV remdesivir daily day 3  ID consult appreciated   DDimer 300'--> 276.  Ferritin 1300--> 1600s.   daily CMP while on remdesivir   S/p monoclonal AB infusion on 5/24.    cont supportive care   abg PaO2 63. no evidence of resp acidosis      #Uncontrolled IDDM:    monitor BGM TID while on dexamethasone   Patient notes hasn't been eating/ has not taken insulin in 3 weeks.    Previously using Humalog 75/25 45 units BID.    increased lantus to 45 units   increased ademelog 8 TID with meals.    Sliding scale.    a1c 9.6       #pseudoHyponatremia:    due to hyperglycemia.  BGM 300s - corrected Na 140    #HTN:    Cont home meds.      #HLD:    Cont home meds.      #Chronic Pain:    Home pain meds nonformulary.    Start oxycontin 10 BID.    Percocet PRN.      #DVT Proph:    Lovenox.

## 2021-05-28 NOTE — PROGRESS NOTE ADULT - ASSESSMENT
62 y/o male with PMHX of IDDM, HTN, HLD who presented to  with progressive weakness related to COVID-19 infection.  Patient notes he first starting having symptoms last May 16th.  He had multiple negative tests initially but tested positive as of 5/19.  Patient notes several family members at home including his wife and grandson have also tested positive.  Patient states his main symptom has been fatigue/ weakness/ loss of appetite.  Patient notes no real fevers-- Tmax 99.  No significant coughing or SOB.  He has had loss of appetite/ nausea/ and some diarrhea with stool incontinence. 5/23 he presented to  ER and was given monoclonal antibodies and was told to return to the ER if sx were not improved.  Patient returned 5/25.  Spo2 was 93% in ER at rest-- patient now 94% on 3L NC.  Xray with small R infiltrate, was given remdesivir/decadron.     1. covid-19 viral syndrome/pneumonia  - on remdesivir/decadron #4  - continue with steroids/antiviral  - monitor renal/hepatic function closely on therapy  - observe off antibiotics  - fu cbc  - supportive care  - isolation precautions  - monitor temps  - f/u inflammatory markers    2. other issues - care per medicine

## 2021-05-28 NOTE — PROGRESS NOTE ADULT - SUBJECTIVE AND OBJECTIVE BOX
Patient is a 61y old  Male who presents with a chief complaint of weakness (26 May 2021 09:59)      SUBJECTIVE:   HPI:  60 y/o male with PMHX of IDDM, HTN, HLD who presented to  with progressive weakness related to COVID-19 infection.  Patient notes he first starting having symptoms last cony May 16th.  He had multiple negative tests initially but tested positive as of 5/19.  Patient notes several family members at home including his wife and grandson have also tested positive.  Patient states his main symptom has been fatigue/ weakness/ loss of appetite.  Patient notes no real fevers-- Tmax 99.  No significant coughing or SOB.  He has had loss of appetite/ nausea/ and some diarrhea with stool incontinence.  Yesterday, he presented to  ER and was given monoclonal antibodies and was told to return to the ER if sx were not improved.  Patient returns today.  Spo2 was 93% in ER at rest-- patient now 94% on 3L NC.      5/26/21: sitting up in bed. felt short of breath while walking to bathroom resolved after 4lpm NC. concerned about his blood sugars being elevated.   5/27/21: sitting up today. slightly confused, reports seeing things on his body and in his room.   5/28/21: seen and examined sitting up in bed. less confused today. no pain, dyspnea or fever today. plan of care and management plan discussed with pt and daughter/wife via telephone.     REVIEW OF SYSTEMS:    CONSTITUTIONAL: No weakness, fevers or chills  EYES/ENT: No visual changes;  No vertigo or throat pain   NECK: No pain or stiffness  RESPIRATORY: exertional dyspnea. slight cough. no wheezing, hemoptysis; No shortness of breath  CARDIOVASCULAR: No chest pain or palpitations  GASTROINTESTINAL: No abdominal or epigastric pain. No nausea, vomiting, or hematemesis; No diarrhea or constipation. No melena or hematochezia.  GENITOURINARY: No dysuria, frequency or hematuria  NEUROLOGICAL: No numbness or weakness  SKIN: No itching, burning, rashes, or lesions   All other review of systems is negative unless indicated above    Weight (kg): 87.4 (05-25 @ 19:20)    Vital Signs Last 24 Hrs  T(C): 36.6 (27 May 2021 09:04), Max: 36.6 (26 May 2021 15:49)  T(F): 97.8 (27 May 2021 09:04), Max: 97.9 (26 May 2021 15:49)  HR: 65 (27 May 2021 09:04) (65 - 76)  BP: 118/56 (27 May 2021 09:04) (98/61 - 118/56)  BP(mean): --  RR: 18 (27 May 2021 12:22) (18 - 19)  SpO2: 92% (27 May 2021 12:22) (88% - 96%)    CAPILLARY BLOOD GLUCOSE      POCT Blood Glucose.: 375 mg/dL (28 May 2021 12:23)  POCT Blood Glucose.: 339 mg/dL (28 May 2021 09:22)  POCT Blood Glucose.: 277 mg/dL (27 May 2021 22:16)  POCT Blood Glucose.: 289 mg/dL (27 May 2021 16:54)      PHYSICAL EXAM:    Constitutional: NAD, awake and alert, well-developed  HEENT: PERR, EOMI, Normal Hearing, MMM  Neck: Soft and supple, No LAD, No JVD  Respiratory: Breath sounds are clear bilaterally, No wheezing, rales or rhonchi  Cardiovascular: S1 and S2, regular rate and rhythm, no Murmurs, gallops or rubs  Gastrointestinal: Bowel Sounds present, soft, nontender, nondistended, no guarding, no rebound  Extremities: No peripheral edema  Vascular: 2+ peripheral pulses  Neurological: A/O x 3, no focal deficits. intermittent confusion   Musculoskeletal: 5/5 strength b/l upper and lower extremities  Skin: flushed face. No rashes    MEDICATIONS:  MEDICATIONS  (STANDING):  aspirin enteric coated 81 milliGRAM(s) Oral daily  dexAMETHasone  Injectable 6 milliGRAM(s) IV Push daily  dextrose 40% Gel 15 Gram(s) Oral once  dextrose 5%. 1000 milliLiter(s) (50 mL/Hr) IV Continuous <Continuous>  dextrose 5%. 1000 milliLiter(s) (100 mL/Hr) IV Continuous <Continuous>  dextrose 50% Injectable 25 Gram(s) IV Push once  dextrose 50% Injectable 12.5 Gram(s) IV Push once  dextrose 50% Injectable 25 Gram(s) IV Push once  enoxaparin Injectable 40 milliGRAM(s) SubCutaneous daily  famotidine    Tablet 20 milliGRAM(s) Oral at bedtime  glucagon  Injectable 1 milliGRAM(s) IntraMuscular once  insulin glargine Injectable (LANTUS) 20 Unit(s) SubCutaneous at bedtime  insulin lispro (ADMELOG) corrective regimen sliding scale   SubCutaneous three times a day before meals  insulin lispro (ADMELOG) corrective regimen sliding scale   SubCutaneous at bedtime  insulin lispro Injectable (ADMELOG) 5 Unit(s) SubCutaneous three times a day before meals  losartan 25 milliGRAM(s) Oral daily  metoprolol succinate  milliGRAM(s) Oral daily  oxyCODONE  ER Tablet 10 milliGRAM(s) Oral every 12 hours  remdesivir  IVPB 100 milliGRAM(s) IV Intermittent every 24 hours  remdesivir  IVPB   IV Intermittent   simvastatin 20 milliGRAM(s) Oral at bedtime      LABS: All Labs Reviewed:                     13.4   8.43  )-----------( 180      ( 26 May 2021 07:32 )             41.2     05-26    135  |  104  |  27<H>  ----------------------------<  329<H>  4.3   |  24  |  1.07    Ca    9.0      26 May 2021 07:32    TPro  6.9  /  Alb  2.4<L>  /  TBili  0.4  /  DBili  x   /  AST  28  /  ALT  21  /  AlkPhos  57  05-26    PT/INR - ( 26 May 2021 07:32 )   PT: 15.1 sec;   INR: 1.32 ratio        CARDIAC MARKERS ( 25 May 2021 11:43 )  <0.015 ng/mL / x     / x     / x     / x        Blood Culture:     RADIOLOGY/EKG:  < from: Xray Chest 1 View- PORTABLE-Urgent (05.25.21 @ 12:53) >    IMPRESSION: Small infiltrate off right upper hilum somewhat increased from prior.      NABILA BROWNE MD; Attending Radiologist  This document has been electronically signed. May 25 2021  2:39PM    < end of copied text >           Patient is a 61y old  Male who presents with a chief complaint of weakness (26 May 2021 09:59)      SUBJECTIVE:   HPI:  60 y/o male with PMHX of IDDM, HTN, HLD who presented to  with progressive weakness related to COVID-19 infection.  Patient notes he first starting having symptoms last cony May 16th.  He had multiple negative tests initially but tested positive as of 5/19.  Patient notes several family members at home including his wife and grandson have also tested positive.  Patient states his main symptom has been fatigue/ weakness/ loss of appetite.  Patient notes no real fevers-- Tmax 99.  No significant coughing or SOB.  He has had loss of appetite/ nausea/ and some diarrhea with stool incontinence.  Yesterday, he presented to  ER and was given monoclonal antibodies and was told to return to the ER if sx were not improved.  Patient returns today.  Spo2 was 93% in ER at rest-- patient now 94% on 3L NC.      5/26/21: sitting up in bed. felt short of breath while walking to bathroom resolved after 4lpm NC. concerned about his blood sugars being elevated.   5/27/21: sitting up today. slightly confused, reports seeing things on his body and in his room.   5/28/21: seen and examined sitting up in bed. less confused today. no pain, dyspnea or fever today. plan of care and management plan discussed with pt and daughter/wife via telephone.     REVIEW OF SYSTEMS:    CONSTITUTIONAL: No weakness, fevers or chills  EYES/ENT: No visual changes;  No vertigo or throat pain   NECK: No pain or stiffness  RESPIRATORY: exertional dyspnea. slight cough. no wheezing, hemoptysis; No shortness of breath  CARDIOVASCULAR: No chest pain or palpitations  GASTROINTESTINAL: No abdominal or epigastric pain. No nausea, vomiting, or hematemesis; No diarrhea or constipation. No melena or hematochezia.  GENITOURINARY: No dysuria, frequency or hematuria  NEUROLOGICAL: No numbness or weakness  SKIN: No itching, burning, rashes, or lesions   All other review of systems is negative unless indicated above    Weight (kg): 87.4 (05-25 @ 19:20)    Vital Signs Last 24 Hrs  T(C): 36.6 (27 May 2021 09:04), Max: 36.6 (26 May 2021 15:49)  T(F): 97.8 (27 May 2021 09:04), Max: 97.9 (26 May 2021 15:49)  HR: 65 (27 May 2021 09:04) (65 - 76)  BP: 118/56 (27 May 2021 09:04) (98/61 - 118/56)  BP(mean): --  RR: 18 (27 May 2021 12:22) (18 - 19)  SpO2: 92% (27 May 2021 12:22) (88% - 96%)    CAPILLARY BLOOD GLUCOSE      POCT Blood Glucose.: 375 mg/dL (28 May 2021 12:23)  POCT Blood Glucose.: 339 mg/dL (28 May 2021 09:22)  POCT Blood Glucose.: 277 mg/dL (27 May 2021 22:16)  POCT Blood Glucose.: 289 mg/dL (27 May 2021 16:54)      PHYSICAL EXAM:    Constitutional: NAD, awake and alert, well-developed  HEENT: PERR, EOMI, Normal Hearing, MMM  Neck: Soft and supple, No LAD, No JVD  Respiratory: Breath sounds are clear bilaterally, No wheezing, rales or rhonchi  Cardiovascular: S1 and S2, regular rate and rhythm, no Murmurs, gallops or rubs  Gastrointestinal: Bowel Sounds present, soft, nontender, nondistended, no guarding, no rebound  Extremities: No peripheral edema  Vascular: 2+ peripheral pulses  Neurological: A/O x 3, no focal deficits. intermittent confusion   Musculoskeletal: 5/5 strength b/l upper and lower extremities  Skin: flushed face. No rashes    MEDICATIONS:  MEDICATIONS  (STANDING):  aspirin enteric coated 81 milliGRAM(s) Oral daily  dexAMETHasone  Injectable 6 milliGRAM(s) IV Push daily  dextrose 40% Gel 15 Gram(s) Oral once  dextrose 5%. 1000 milliLiter(s) (50 mL/Hr) IV Continuous <Continuous>  dextrose 5%. 1000 milliLiter(s) (100 mL/Hr) IV Continuous <Continuous>  dextrose 50% Injectable 25 Gram(s) IV Push once  dextrose 50% Injectable 12.5 Gram(s) IV Push once  dextrose 50% Injectable 25 Gram(s) IV Push once  enoxaparin Injectable 40 milliGRAM(s) SubCutaneous daily  famotidine    Tablet 20 milliGRAM(s) Oral at bedtime  glucagon  Injectable 1 milliGRAM(s) IntraMuscular once  insulin glargine Injectable (LANTUS) 20 Unit(s) SubCutaneous at bedtime  insulin lispro (ADMELOG) corrective regimen sliding scale   SubCutaneous three times a day before meals  insulin lispro (ADMELOG) corrective regimen sliding scale   SubCutaneous at bedtime  insulin lispro Injectable (ADMELOG) 5 Unit(s) SubCutaneous three times a day before meals  losartan 25 milliGRAM(s) Oral daily  metoprolol succinate  milliGRAM(s) Oral daily  oxyCODONE  ER Tablet 10 milliGRAM(s) Oral every 12 hours  remdesivir  IVPB 100 milliGRAM(s) IV Intermittent every 24 hours  remdesivir  IVPB   IV Intermittent   simvastatin 20 milliGRAM(s) Oral at bedtime      LABS: All Labs Reviewed:                     13.4   8.43  )-----------( 180      ( 26 May 2021 07:32 )             41.2     05-26    135  |  104  |  27<H>  ----------------------------<  329<H>  4.3   |  24  |  1.07    Ca    9.0      26 May 2021 07:32    TPro  6.9  /  Alb  2.4<L>  /  TBili  0.4  /  DBili  x   /  AST  28  /  ALT  21  /  AlkPhos  57  05-26    PT/INR - ( 26 May 2021 07:32 )   PT: 15.1 sec;   INR: 1.32 ratio      CARDIAC MARKERS ( 25 May 2021 11:43 )  <0.015 ng/mL / x     / x     / x     / x        Blood Gas Profile - Arterial in AM (05.28.21 @ 09:09)    pH, Arterial: 7.42    pCO2, Arterial: 35 mmHg    pO2, Arterial: 63 mmHg    HCO3, Arterial: 23 mmoL/L    Base Excess, Arterial: -.8 mmol/L    Oxygen Saturation, Arterial: 91 %    Blood Gas Comments Arterial: FIO2:_  MODE:_   VT:_   RATE:_   PEEP:_  Comment:_[id number]    Blood Gas Source Arterial: Arterial        Blood Culture:     RADIOLOGY/EKG:  < from: Xray Chest 1 View- PORTABLE-Urgent (05.25.21 @ 12:53) >    IMPRESSION: Small infiltrate off right upper hilum somewhat increased from prior.      NABILA BROWNE MD; Attending Radiologist  This document has been electronically signed. May 25 2021  2:39PM    < end of copied text >

## 2021-05-29 LAB
ALBUMIN SERPL ELPH-MCNC: 2.5 G/DL — LOW (ref 3.3–5)
ALP SERPL-CCNC: 64 U/L — SIGNIFICANT CHANGE UP (ref 40–120)
ALT FLD-CCNC: 17 U/L — SIGNIFICANT CHANGE UP (ref 12–78)
ANION GAP SERPL CALC-SCNC: 6 MMOL/L — SIGNIFICANT CHANGE UP (ref 5–17)
AST SERPL-CCNC: 14 U/L — LOW (ref 15–37)
BILIRUB SERPL-MCNC: 0.5 MG/DL — SIGNIFICANT CHANGE UP (ref 0.2–1.2)
BUN SERPL-MCNC: 27 MG/DL — HIGH (ref 7–23)
CALCIUM SERPL-MCNC: 9.2 MG/DL — SIGNIFICANT CHANGE UP (ref 8.5–10.1)
CHLORIDE SERPL-SCNC: 108 MMOL/L — SIGNIFICANT CHANGE UP (ref 96–108)
CO2 SERPL-SCNC: 28 MMOL/L — SIGNIFICANT CHANGE UP (ref 22–31)
CREAT SERPL-MCNC: 1.06 MG/DL — SIGNIFICANT CHANGE UP (ref 0.5–1.3)
GLUCOSE SERPL-MCNC: 228 MG/DL — HIGH (ref 70–99)
HCT VFR BLD CALC: 43.6 % — SIGNIFICANT CHANGE UP (ref 39–50)
HGB BLD-MCNC: 14.1 G/DL — SIGNIFICANT CHANGE UP (ref 13–17)
INR BLD: 1.24 RATIO — HIGH (ref 0.88–1.16)
MCHC RBC-ENTMCNC: 30.3 PG — SIGNIFICANT CHANGE UP (ref 27–34)
MCHC RBC-ENTMCNC: 32.3 GM/DL — SIGNIFICANT CHANGE UP (ref 32–36)
MCV RBC AUTO: 93.8 FL — SIGNIFICANT CHANGE UP (ref 80–100)
PLATELET # BLD AUTO: 290 K/UL — SIGNIFICANT CHANGE UP (ref 150–400)
POTASSIUM SERPL-MCNC: 4 MMOL/L — SIGNIFICANT CHANGE UP (ref 3.5–5.3)
POTASSIUM SERPL-SCNC: 4 MMOL/L — SIGNIFICANT CHANGE UP (ref 3.5–5.3)
PROT SERPL-MCNC: 6.5 GM/DL — SIGNIFICANT CHANGE UP (ref 6–8.3)
PROTHROM AB SERPL-ACNC: 14.4 SEC — HIGH (ref 10.6–13.6)
RBC # BLD: 4.65 M/UL — SIGNIFICANT CHANGE UP (ref 4.2–5.8)
RBC # FLD: 12.8 % — SIGNIFICANT CHANGE UP (ref 10.3–14.5)
SODIUM SERPL-SCNC: 142 MMOL/L — SIGNIFICANT CHANGE UP (ref 135–145)
WBC # BLD: 11.18 K/UL — HIGH (ref 3.8–10.5)
WBC # FLD AUTO: 11.18 K/UL — HIGH (ref 3.8–10.5)

## 2021-05-29 PROCEDURE — 99232 SBSQ HOSP IP/OBS MODERATE 35: CPT

## 2021-05-29 RX ADMIN — OXYCODONE HYDROCHLORIDE 10 MILLIGRAM(S): 5 TABLET ORAL at 08:56

## 2021-05-29 RX ADMIN — Medication 8 UNIT(S): at 12:03

## 2021-05-29 RX ADMIN — OXYCODONE HYDROCHLORIDE 10 MILLIGRAM(S): 5 TABLET ORAL at 09:45

## 2021-05-29 RX ADMIN — Medication 6 MILLIGRAM(S): at 08:58

## 2021-05-29 RX ADMIN — INSULIN GLARGINE 45 UNIT(S): 100 INJECTION, SOLUTION SUBCUTANEOUS at 22:03

## 2021-05-29 RX ADMIN — Medication 8 UNIT(S): at 16:59

## 2021-05-29 RX ADMIN — Medication 100 MILLIGRAM(S): at 08:56

## 2021-05-29 RX ADMIN — Medication 2: at 16:58

## 2021-05-29 RX ADMIN — ENOXAPARIN SODIUM 40 MILLIGRAM(S): 100 INJECTION SUBCUTANEOUS at 08:54

## 2021-05-29 RX ADMIN — OXYCODONE HYDROCHLORIDE 10 MILLIGRAM(S): 5 TABLET ORAL at 22:04

## 2021-05-29 RX ADMIN — Medication 81 MILLIGRAM(S): at 08:56

## 2021-05-29 RX ADMIN — Medication 2: at 22:04

## 2021-05-29 RX ADMIN — FAMOTIDINE 20 MILLIGRAM(S): 10 INJECTION INTRAVENOUS at 22:04

## 2021-05-29 RX ADMIN — Medication 2: at 12:02

## 2021-05-29 RX ADMIN — LOSARTAN POTASSIUM 25 MILLIGRAM(S): 100 TABLET, FILM COATED ORAL at 08:55

## 2021-05-29 RX ADMIN — REMDESIVIR 540 MILLIGRAM(S): 5 INJECTION INTRAVENOUS at 22:04

## 2021-05-29 RX ADMIN — Medication 8 UNIT(S): at 07:58

## 2021-05-29 RX ADMIN — Medication 2: at 07:57

## 2021-05-29 RX ADMIN — SIMVASTATIN 20 MILLIGRAM(S): 20 TABLET, FILM COATED ORAL at 22:04

## 2021-05-29 NOTE — PROGRESS NOTE ADULT - ASSESSMENT
60 y/o male with PMHX of IDDM, HTN, HLD who presented to  with progressive weakness related to COVID-19 infection.  Patient notes he first starting having symptoms last May 16th.  He had multiple negative tests initially but tested positive as of 5/19.  Patient notes several family members at home including his wife and grandson have also tested positive.  Patient states his main symptom has been fatigue/ weakness/ loss of appetite.  Patient notes no real fevers-- Tmax 99.  No significant coughing or SOB.  He has had loss of appetite/ nausea/ and some diarrhea with stool incontinence. 5/23 he presented to  ER and was given monoclonal antibodies and was told to return to the ER if sx were not improved.  Patient returned 5/25.  Spo2 was 93% in ER at rest-- patient now 94% on 3L NC.  Xray with small R infiltrate, was given remdesivir/decadron.     1. covid-19 viral syndrome/pneumonia  - on remdesivir/decadron #5  - continue with steroids/antiviral  - monitor renal/hepatic function closely on therapy  - observe off antibiotics  - fu cbc  - supportive care  - isolation precautions  - monitor temps  - f/u inflammatory markers    2. other issues - care per medicine

## 2021-05-29 NOTE — PROGRESS NOTE ADULT - ASSESSMENT
62 y/o male with PMHX of IDDM, HTN, HLD who presented to  with progressive weakness related to COVID-19 infection.  Spo2 was 93% in ER at rest-- patient now 94% on 3L NC.      #Acute Hypoxic Respiratory Failure / acute metabolic encephalopathy secondary to COVID -19 PNA   Maintain SP02 >90% - now on NC at 4 lpm, noncompliant occasionally   confusion likely secondary to episodes of hypoxia   IV decadron 6 mg IV daily day 4   IV remdesivir daily day 4  ID consult appreciated   DDimer 300'--> 276.  Ferritin 1300--> 1600s.   daily CMP while on remdesivir   S/p monoclonal AB infusion on 5/24.    cont supportive care       #Uncontrolled IDDM:    monitor BGM TID while on dexamethasone   Patient notes hasn't been eating/ has not taken insulin in 3 weeks.    Previously using Humalog 75/25 45 units BID.    increased lantus to 45 units   increased ademelog 8 TID with meals.    Sliding scale.    a1c 9.6       #pseudoHyponatremia:    Resolved    #HTN:    Cont home meds.      #HLD:    Cont home meds.      #Chronic Pain:    Home pain meds nonformulary.    Start oxycontin 10 BID.    Percocet PRN.      #DVT Proph:    Lovenox.

## 2021-05-29 NOTE — PROGRESS NOTE ADULT - SUBJECTIVE AND OBJECTIVE BOX
Patient is a 61y old  Male who presents with a chief complaint of weakness (26 May 2021 09:59)      SUBJECTIVE:   HPI:  60 y/o male with PMHX of IDDM, HTN, HLD who presented to  with progressive weakness related to COVID-19 infection.  Patient notes he first starting having symptoms last cony May 16th.  He had multiple negative tests initially but tested positive as of 5/19.  Patient notes several family members at home including his wife and grandson have also tested positive.  Patient states his main symptom has been fatigue/ weakness/ loss of appetite.  Patient notes no real fevers-- Tmax 99.  No significant coughing or SOB.  He has had loss of appetite/ nausea/ and some diarrhea with stool incontinence.  Yesterday, he presented to  ER and was given monoclonal antibodies and was told to return to the ER if sx were not improved.  Patient returns today.  Spo2 was 93% in ER at rest-- patient now 94% on 3L NC.      5/26/21: sitting up in bed. felt short of breath while walking to bathroom resolved after 4lpm NC. concerned about his blood sugars being elevated.   5/27/21: sitting up today. slightly confused, reports seeing things on his body and in his room.   5/28/21: seen and examined sitting up in bed. less confused today. no pain, dyspnea or fever today. plan of care and management plan discussed with pt and daughter/wife via telephone.   05/29/21: Mental status better today. He denies any complaints.     REVIEW OF SYSTEMS:    CONSTITUTIONAL: No weakness, fevers or chills  EYES/ENT: No visual changes;  No vertigo or throat pain   NECK: No pain or stiffness  RESPIRATORY: exertional dyspnea. slight cough. no wheezing, hemoptysis; No shortness of breath  CARDIOVASCULAR: No chest pain or palpitations  GASTROINTESTINAL: No abdominal or epigastric pain. No nausea, vomiting, or hematemesis; No diarrhea or constipation. No melena or hematochezia.  GENITOURINARY: No dysuria, frequency or hematuria  NEUROLOGICAL: No numbness or weakness  SKIN: No itching, burning, rashes, or lesions   All other review of systems is negative unless indicated above      Vital Signs Last 24 Hrs  T(C): 37 (29 May 2021 08:42), Max: 37.3 (28 May 2021 22:20)  T(F): 98.6 (29 May 2021 08:42), Max: 99.2 (28 May 2021 22:20)  HR: 84 (29 May 2021 08:42) (62 - 84)  BP: 120/41 (29 May 2021 08:42) (108/67 - 120/41)  BP(mean): --  RR: 21 (29 May 2021 08:42) (18 - 21)  SpO2: 90% (29 May 2021 10:25) (90% - 98%)      PHYSICAL EXAM:    Constitutional: NAD, awake and alert, well-developed  HEENT: PERR, EOMI, Normal Hearing, MMM  Neck: Soft and supple, No LAD, No JVD  Respiratory: Breath sounds are clear bilaterally, No wheezing, rales or rhonchi  Cardiovascular: S1 and S2, regular rate and rhythm, no Murmurs, gallops or rubs  Gastrointestinal: Bowel Sounds present, soft, nontender, nondistended, no guarding, no rebound  Extremities: No peripheral edema  Vascular: 2+ peripheral pulses  Neurological: A/O x 3, no focal deficits. intermittent confusion   Musculoskeletal: 5/5 strength b/l upper and lower extremities  Skin: flushed face. No rashes    MEDICATIONS:  MEDICATIONS  (STANDING):  aspirin enteric coated 81 milliGRAM(s) Oral daily  dexAMETHasone  Injectable 6 milliGRAM(s) IV Push daily  dextrose 40% Gel 15 Gram(s) Oral once  dextrose 5%. 1000 milliLiter(s) (50 mL/Hr) IV Continuous <Continuous>  dextrose 5%. 1000 milliLiter(s) (100 mL/Hr) IV Continuous <Continuous>  dextrose 50% Injectable 25 Gram(s) IV Push once  dextrose 50% Injectable 12.5 Gram(s) IV Push once  dextrose 50% Injectable 25 Gram(s) IV Push once  enoxaparin Injectable 40 milliGRAM(s) SubCutaneous daily  famotidine    Tablet 20 milliGRAM(s) Oral at bedtime  glucagon  Injectable 1 milliGRAM(s) IntraMuscular once  insulin glargine Injectable (LANTUS) 20 Unit(s) SubCutaneous at bedtime  insulin lispro (ADMELOG) corrective regimen sliding scale   SubCutaneous three times a day before meals  insulin lispro (ADMELOG) corrective regimen sliding scale   SubCutaneous at bedtime  insulin lispro Injectable (ADMELOG) 5 Unit(s) SubCutaneous three times a day before meals  losartan 25 milliGRAM(s) Oral daily  metoprolol succinate  milliGRAM(s) Oral daily  oxyCODONE  ER Tablet 10 milliGRAM(s) Oral every 12 hours  remdesivir  IVPB 100 milliGRAM(s) IV Intermittent every 24 hours  remdesivir  IVPB   IV Intermittent   simvastatin 20 milliGRAM(s) Oral at bedtime      LABS: All Labs Reviewed:                                               14.1   11.18 )-----------( 290      ( 29 May 2021 07:27 )             43.6     29 May 2021 07:27    142    |  108    |  27     ----------------------------<  228    4.0     |  28     |  1.06     Ca    9.2        29 May 2021 07:27    TPro  6.5    /  Alb  2.5    /  TBili  0.5    /  DBili  <0.1   /  AST  14     /  ALT  17     /  AlkPhos  64     29 May 2021 07:27    LIVER FUNCTIONS - ( 29 May 2021 07:27 )  Alb: 2.5 g/dL / Pro: 6.5 gm/dL / ALK PHOS: 64 U/L / ALT: 17 U/L / AST: 14 U/L / GGT: x           PT/INR - ( 29 May 2021 07:27 )   PT: 14.4 sec;   INR: 1.24 ratio           CAPILLARY BLOOD GLUCOSE      POCT Blood Glucose.: 190 mg/dL (29 May 2021 11:59)  POCT Blood Glucose.: 194 mg/dL (29 May 2021 07:53)  POCT Blood Glucose.: 260 mg/dL (28 May 2021 22:09)  POCT Blood Glucose.: 310 mg/dL (28 May 2021 18:05)

## 2021-05-29 NOTE — PROGRESS NOTE ADULT - SUBJECTIVE AND OBJECTIVE BOX
Date of service: 05-29-21 @ 10:23    pt seen and examined  on 3L NC, o2 sats > 90s  no resp distress  afebrile  sitting up in bed    ROS: no fever or chills; denies dizziness, no HA,  no abdominal pain, no diarrhea or constipation; no dysuria, no urinary frequency, no legs pain, no rashes    MEDICATIONS  (STANDING):  aspirin enteric coated 81 milliGRAM(s) Oral daily  dexAMETHasone  Injectable 6 milliGRAM(s) IV Push daily  dextrose 40% Gel 15 Gram(s) Oral once  dextrose 5%. 1000 milliLiter(s) (50 mL/Hr) IV Continuous <Continuous>  dextrose 5%. 1000 milliLiter(s) (100 mL/Hr) IV Continuous <Continuous>  dextrose 50% Injectable 25 Gram(s) IV Push once  dextrose 50% Injectable 12.5 Gram(s) IV Push once  dextrose 50% Injectable 25 Gram(s) IV Push once  enoxaparin Injectable 40 milliGRAM(s) SubCutaneous daily  famotidine    Tablet 20 milliGRAM(s) Oral at bedtime  glucagon  Injectable 1 milliGRAM(s) IntraMuscular once  insulin glargine Injectable (LANTUS) 45 Unit(s) SubCutaneous at bedtime  insulin lispro (ADMELOG) corrective regimen sliding scale   SubCutaneous three times a day before meals  insulin lispro (ADMELOG) corrective regimen sliding scale   SubCutaneous at bedtime  insulin lispro Injectable (ADMELOG) 8 Unit(s) SubCutaneous three times a day before meals  losartan 25 milliGRAM(s) Oral daily  metoprolol succinate  milliGRAM(s) Oral daily  oxyCODONE  ER Tablet 10 milliGRAM(s) Oral every 12 hours  remdesivir  IVPB 100 milliGRAM(s) IV Intermittent every 24 hours  remdesivir  IVPB   IV Intermittent   simvastatin 20 milliGRAM(s) Oral at bedtime    Vital Signs Last 24 Hrs  T(C): 37 (29 May 2021 08:42), Max: 37.3 (28 May 2021 22:20)  T(F): 98.6 (29 May 2021 08:42), Max: 99.2 (28 May 2021 22:20)  HR: 84 (29 May 2021 08:42) (62 - 84)  BP: 120/41 (29 May 2021 08:42) (108/67 - 120/41)  BP(mean): --  RR: 21 (29 May 2021 08:42) (18 - 21)  SpO2: 94% (29 May 2021 08:42) (93% - 98%)      PE:  Constitutional: frail looking  HEENT: NC/AT, EOMI, PERRLA, conjunctivae clear; ears and nose atraumatic; pharynx benign  Neck: supple; thyroid not palpable  Back: no tenderness  Respiratory: decreased breath sounds  Cardiovascular: S1S2 regular, no murmurs  Abdomen: soft, not tender, not distended, positive BS; liver and spleen WNL  Genitourinary: no suprapubic tenderness  Lymphatic: no LN palpable  Musculoskeletal: no muscle tenderness, no joint swelling or tenderness  Extremities: no pedal edema  Neurological/ Psychiatric: AxOx3, Judgement and insight normal;  moving all extremities  Skin: no rashes; no palpable lesions    Labs: all available labs reviewed                                   14.1   11.18 )-----------( 290      ( 29 May 2021 07:27 )             43.6     05-29    142  |  108  |  27<H>  ----------------------------<  228<H>  4.0   |  28  |  1.06    Ca    9.2      29 May 2021 07:27    TPro  6.5  /  Alb  2.5<L>  /  TBili  0.5  /  DBili  <0.1  /  AST  14<L>  /  ALT  17  /  AlkPhos  64  05-29          Ferritin, Serum: 1682 ng/mL (05-27-21 @ 08:03)  C-Reactive Protein, Serum: 78 mg/L (05-27-21 @ 08:03)  D-Dimer Assay, Quantitative: 276 ng/mL DDU (05-27-21 @ 08:03)  C-Reactive Protein, Serum: 174 mg/L (05-25-21 @ 11:43)  D-Dimer Assay, Quantitative: 331 ng/mL DDU (05-25-21 @ 11:43)  Ferritin, Serum: 1310 ng/mL (05-25-21 @ 11:43)      Radiology: all available radiological tests reviewed      EXAM:  XR CHEST PORTABLE URGENT 1V                            PROCEDURE DATE:  05/25/2021          INTERPRETATION:  AP chest on May 25, 2021 at 12:45 PM. Patient is short of breath with cough and fever.    Heart magnified by technique.    Small infiltrate off right upper hilum noted.    This infiltrate more visible than on May 23.    IMPRESSION: Small infiltrate off right upper hilum somewhat increased from prior.    < end of copied text >    Advanced directives addressed: full resuscitation

## 2021-05-30 LAB
ALBUMIN SERPL ELPH-MCNC: 2.3 G/DL — LOW (ref 3.3–5)
ALP SERPL-CCNC: 56 U/L — SIGNIFICANT CHANGE UP (ref 40–120)
ALT FLD-CCNC: 14 U/L — SIGNIFICANT CHANGE UP (ref 12–78)
AST SERPL-CCNC: 15 U/L — SIGNIFICANT CHANGE UP (ref 15–37)
BILIRUB DIRECT SERPL-MCNC: 0.2 MG/DL — SIGNIFICANT CHANGE UP (ref 0–0.2)
BILIRUB INDIRECT FLD-MCNC: 0.4 MG/DL — SIGNIFICANT CHANGE UP (ref 0.2–1)
BILIRUB SERPL-MCNC: 0.6 MG/DL — SIGNIFICANT CHANGE UP (ref 0.2–1.2)
CREAT SERPL-MCNC: 0.76 MG/DL — SIGNIFICANT CHANGE UP (ref 0.5–1.3)
CULTURE RESULTS: SIGNIFICANT CHANGE UP
INR BLD: 1.32 RATIO — HIGH (ref 0.88–1.16)
PROT SERPL-MCNC: 6.2 GM/DL — SIGNIFICANT CHANGE UP (ref 6–8.3)
PROTHROM AB SERPL-ACNC: 15.1 SEC — HIGH (ref 10.6–13.6)
SPECIMEN SOURCE: SIGNIFICANT CHANGE UP

## 2021-05-30 PROCEDURE — 99232 SBSQ HOSP IP/OBS MODERATE 35: CPT

## 2021-05-30 RX ADMIN — Medication 6 MILLIGRAM(S): at 09:35

## 2021-05-30 RX ADMIN — SIMVASTATIN 20 MILLIGRAM(S): 20 TABLET, FILM COATED ORAL at 20:52

## 2021-05-30 RX ADMIN — Medication 8 UNIT(S): at 16:37

## 2021-05-30 RX ADMIN — Medication 100 MILLIGRAM(S): at 09:36

## 2021-05-30 RX ADMIN — LOSARTAN POTASSIUM 25 MILLIGRAM(S): 100 TABLET, FILM COATED ORAL at 09:36

## 2021-05-30 RX ADMIN — OXYCODONE HYDROCHLORIDE 10 MILLIGRAM(S): 5 TABLET ORAL at 09:36

## 2021-05-30 RX ADMIN — OXYCODONE HYDROCHLORIDE 10 MILLIGRAM(S): 5 TABLET ORAL at 10:15

## 2021-05-30 RX ADMIN — FAMOTIDINE 20 MILLIGRAM(S): 10 INJECTION INTRAVENOUS at 20:52

## 2021-05-30 RX ADMIN — Medication 4: at 08:03

## 2021-05-30 RX ADMIN — OXYCODONE HYDROCHLORIDE 10 MILLIGRAM(S): 5 TABLET ORAL at 20:52

## 2021-05-30 RX ADMIN — Medication 81 MILLIGRAM(S): at 09:35

## 2021-05-30 RX ADMIN — ENOXAPARIN SODIUM 40 MILLIGRAM(S): 100 INJECTION SUBCUTANEOUS at 09:35

## 2021-05-30 RX ADMIN — Medication 8 UNIT(S): at 08:03

## 2021-05-30 RX ADMIN — Medication 8 UNIT(S): at 11:50

## 2021-05-30 RX ADMIN — INSULIN GLARGINE 45 UNIT(S): 100 INJECTION, SOLUTION SUBCUTANEOUS at 20:52

## 2021-05-30 NOTE — PROGRESS NOTE ADULT - SUBJECTIVE AND OBJECTIVE BOX
Patient is a 61y old  Male who presents with a chief complaint of weakness (26 May 2021 09:59)      SUBJECTIVE:   HPI:  60 y/o male with PMHX of IDDM, HTN, HLD who presented to  with progressive weakness related to COVID-19 infection.  Patient notes he first starting having symptoms last cony May 16th.  He had multiple negative tests initially but tested positive as of 5/19.  Patient notes several family members at home including his wife and grandson have also tested positive.  Patient states his main symptom has been fatigue/ weakness/ loss of appetite.  Patient notes no real fevers-- Tmax 99.  No significant coughing or SOB.  He has had loss of appetite/ nausea/ and some diarrhea with stool incontinence.  Yesterday, he presented to  ER and was given monoclonal antibodies and was told to return to the ER if sx were not improved.  Patient returns today.  Spo2 was 93% in ER at rest-- patient now 94% on 3L NC.      5/26/21: sitting up in bed. felt short of breath while walking to bathroom resolved after 4lpm NC. concerned about his blood sugars being elevated.   5/27/21: sitting up today. slightly confused, reports seeing things on his body and in his room.   5/28/21: seen and examined sitting up in bed. less confused today. no pain, dyspnea or fever today. plan of care and management plan discussed with pt and daughter/wife via telephone.   05/29/21: Mental status better today. He denies any complaints.   05/30/21: He denies any new complaints. O2 sats on 3L NC 95%    REVIEW OF SYSTEMS:    CONSTITUTIONAL: No weakness, fevers or chills  EYES/ENT: No visual changes;  No vertigo or throat pain   NECK: No pain or stiffness  RESPIRATORY: exertional dyspnea. slight cough. no wheezing, hemoptysis; No shortness of breath  CARDIOVASCULAR: No chest pain or palpitations  GASTROINTESTINAL: No abdominal or epigastric pain. No nausea, vomiting, or hematemesis; No diarrhea or constipation. No melena or hematochezia.  GENITOURINARY: No dysuria, frequency or hematuria  NEUROLOGICAL: No numbness or weakness  SKIN: No itching, burning, rashes, or lesions   All other review of systems is negative unless indicated above      Vital Signs Last 24 Hrs  T(C): 36.9 (30 May 2021 08:10), Max: 36.9 (30 May 2021 08:10)  T(F): 98.4 (30 May 2021 08:10), Max: 98.4 (30 May 2021 08:10)  HR: 65 (30 May 2021 08:10) (65 - 68)  BP: 139/77 (30 May 2021 08:10) (101/68 - 150/76)  BP(mean): --  RR: 16 (30 May 2021 08:10) (16 - 20)  SpO2: 93% (30 May 2021 08:10) (90% - 95%)        PHYSICAL EXAM:    Constitutional: NAD, awake and alert, well-developed  HEENT: PERR, EOMI, Normal Hearing, MMM  Neck: Soft and supple, No LAD, No JVD  Respiratory: Breath sounds are clear bilaterally, No wheezing, rales or rhonchi  Cardiovascular: S1 and S2, regular rate and rhythm, no Murmurs, gallops or rubs  Gastrointestinal: Bowel Sounds present, soft, nontender, nondistended, no guarding, no rebound  Extremities: No peripheral edema  Vascular: 2+ peripheral pulses  Neurological: A/O x 3, no focal deficits. intermittent confusion   Musculoskeletal: 5/5 strength b/l upper and lower extremities  Skin: flushed face. No rashes    MEDICATIONS:  MEDICATIONS  (STANDING):  aspirin enteric coated 81 milliGRAM(s) Oral daily  dexAMETHasone  Injectable 6 milliGRAM(s) IV Push daily  dextrose 40% Gel 15 Gram(s) Oral once  dextrose 5%. 1000 milliLiter(s) (50 mL/Hr) IV Continuous <Continuous>  dextrose 5%. 1000 milliLiter(s) (100 mL/Hr) IV Continuous <Continuous>  dextrose 50% Injectable 25 Gram(s) IV Push once  dextrose 50% Injectable 12.5 Gram(s) IV Push once  dextrose 50% Injectable 25 Gram(s) IV Push once  enoxaparin Injectable 40 milliGRAM(s) SubCutaneous daily  famotidine    Tablet 20 milliGRAM(s) Oral at bedtime  glucagon  Injectable 1 milliGRAM(s) IntraMuscular once  insulin glargine Injectable (LANTUS) 20 Unit(s) SubCutaneous at bedtime  insulin lispro (ADMELOG) corrective regimen sliding scale   SubCutaneous three times a day before meals  insulin lispro (ADMELOG) corrective regimen sliding scale   SubCutaneous at bedtime  insulin lispro Injectable (ADMELOG) 5 Unit(s) SubCutaneous three times a day before meals  losartan 25 milliGRAM(s) Oral daily  metoprolol succinate  milliGRAM(s) Oral daily  oxyCODONE  ER Tablet 10 milliGRAM(s) Oral every 12 hours  remdesivir  IVPB 100 milliGRAM(s) IV Intermittent every 24 hours  remdesivir  IVPB   IV Intermittent   simvastatin 20 milliGRAM(s) Oral at bedtime      LABS: All Labs Reviewed:                                               14.1   11.18 )-----------( 290      ( 29 May 2021 07:27 )             43.6     30 May 2021 07:09    x      |  x      |  x      ----------------------------<  x      x       |  x      |  0.76     Ca    9.2        29 May 2021 07:27    TPro  6.2    /  Alb  2.3    /  TBili  0.6    /  DBili  0.2    /  AST  15     /  ALT  14     /  AlkPhos  56     30 May 2021 07:09    LIVER FUNCTIONS - ( 30 May 2021 07:09 )  Alb: 2.3 g/dL / Pro: 6.2 gm/dL / ALK PHOS: 56 U/L / ALT: 14 U/L / AST: 15 U/L / GGT: x           PT/INR - ( 30 May 2021 07:09 )   PT: 15.1 sec;   INR: 1.32 ratio           CAPILLARY BLOOD GLUCOSE      POCT Blood Glucose.: 202 mg/dL (30 May 2021 08:01)  POCT Blood Glucose.: 262 mg/dL (29 May 2021 22:02)  POCT Blood Glucose.: 164 mg/dL (29 May 2021 16:55)  POCT Blood Glucose.: 190 mg/dL (29 May 2021 11:59)                       TALKED TO DR CARLOS'S OFFICE AND THEY SAID THE FORM COULDN'T BE DONE BECAUSE PATIENT NEVER RESPONDED TO THEIR CALLS. FORM DISREGARDED AND SHREDDED. DUE TO NO RESPONSE FROM PT.

## 2021-05-30 NOTE — PROGRESS NOTE ADULT - ASSESSMENT
60 y/o male with PMHX of IDDM, HTN, HLD who presented to  with progressive weakness related to COVID-19 infection.  Spo2 was 93% in ER at rest-- patient now 94% on 3L NC.      #Acute Hypoxic Respiratory Failure / acute metabolic encephalopathy secondary to COVID -19 PNA   Maintain SP02 >90% - now on NC at 4 lpm, noncompliant occasionally   confusion likely secondary to episodes of hypoxia   IV decadron 6 mg IV daily  IV remdesivir- completed 5 days  ID consult appreciated   DDimer 300'--> 276.  Ferritin 1300--> 1600s.   S/p monoclonal AB infusion on 5/24.    cont supportive care       #Uncontrolled IDDM:    monitor BGM TID while on dexamethasone   Previously using Humalog 75/25 45 units BID.    increased lantus to 45 units   increased ademelog 8 TID with meals.    Sliding scale.    a1c 9.6       #pseudoHyponatremia:    Resolved    #HTN:    Cont home meds.      #HLD:    Cont home meds.      #Chronic Pain:    Home pain meds nonformulary.    Start oxycontin 10 BID.    Percocet PRN.      #DVT Proph:    Lovenox.

## 2021-05-30 NOTE — PROGRESS NOTE ADULT - SUBJECTIVE AND OBJECTIVE BOX
Date of service: 05-30-21 @ 12:37    pt seen and examined  on 2L NC, o2 sats > 90s  no resp distress  afebrile  sitting up in bed  depressed, wants to go home    ROS: no fever or chills; denies dizziness, no HA,  no abdominal pain, no diarrhea or constipation; no dysuria, no urinary frequency, no legs pain, no rashes      MEDICATIONS  (STANDING):  aspirin enteric coated 81 milliGRAM(s) Oral daily  dexAMETHasone  Injectable 6 milliGRAM(s) IV Push daily  dextrose 40% Gel 15 Gram(s) Oral once  dextrose 5%. 1000 milliLiter(s) (50 mL/Hr) IV Continuous <Continuous>  dextrose 5%. 1000 milliLiter(s) (100 mL/Hr) IV Continuous <Continuous>  dextrose 50% Injectable 25 Gram(s) IV Push once  dextrose 50% Injectable 12.5 Gram(s) IV Push once  dextrose 50% Injectable 25 Gram(s) IV Push once  enoxaparin Injectable 40 milliGRAM(s) SubCutaneous daily  famotidine    Tablet 20 milliGRAM(s) Oral at bedtime  glucagon  Injectable 1 milliGRAM(s) IntraMuscular once  insulin glargine Injectable (LANTUS) 45 Unit(s) SubCutaneous at bedtime  insulin lispro (ADMELOG) corrective regimen sliding scale   SubCutaneous three times a day before meals  insulin lispro (ADMELOG) corrective regimen sliding scale   SubCutaneous at bedtime  insulin lispro Injectable (ADMELOG) 8 Unit(s) SubCutaneous three times a day before meals  losartan 25 milliGRAM(s) Oral daily  metoprolol succinate  milliGRAM(s) Oral daily  oxyCODONE  ER Tablet 10 milliGRAM(s) Oral every 12 hours  simvastatin 20 milliGRAM(s) Oral at bedtime    Vital Signs Last 24 Hrs  T(C): 36.9 (30 May 2021 08:10), Max: 36.9 (30 May 2021 08:10)  T(F): 98.4 (30 May 2021 08:10), Max: 98.4 (30 May 2021 08:10)  HR: 65 (30 May 2021 08:10) (65 - 68)  BP: 139/77 (30 May 2021 08:10) (101/68 - 150/76)  BP(mean): --  RR: 16 (30 May 2021 08:10) (16 - 20)  SpO2: 94% (30 May 2021 11:56) (90% - 95%)    PE:  Constitutional: frail looking  HEENT: NC/AT, EOMI, PERRLA, conjunctivae clear; ears and nose atraumatic; pharynx benign  Neck: supple; thyroid not palpable  Back: no tenderness  Respiratory: decreased breath sounds  Cardiovascular: S1S2 regular, no murmurs  Abdomen: soft, not tender, not distended, positive BS; liver and spleen WNL  Genitourinary: no suprapubic tenderness  Lymphatic: no LN palpable  Musculoskeletal: no muscle tenderness, no joint swelling or tenderness  Extremities: no pedal edema  Neurological/ Psychiatric: AxOx3, Judgement and insight normal;  moving all extremities  Skin: no rashes; no palpable lesions    Labs: all available labs reviewed                                              14.1   11.18 )-----------( 290      ( 29 May 2021 07:27 )             43.6     05-30    x   |  x   |  x   ----------------------------<  x   x    |  x   |  0.76    Ca    9.2      29 May 2021 07:27    TPro  6.2  /  Alb  2.3<L>  /  TBili  0.6  /  DBili  0.2  /  AST  15  /  ALT  14  /  AlkPhos  56  05-30      Radiology: all available radiological tests reviewed      EXAM:  XR CHEST PORTABLE URGENT 1V                            PROCEDURE DATE:  05/25/2021          INTERPRETATION:  AP chest on May 25, 2021 at 12:45 PM. Patient is short of breath with cough and fever.    Heart magnified by technique.    Small infiltrate off right upper hilum noted.    This infiltrate more visible than on May 23.    IMPRESSION: Small infiltrate off right upper hilum somewhat increased from prior.    < end of copied text >    Advanced directives addressed: full resuscitation

## 2021-05-30 NOTE — PROGRESS NOTE ADULT - ASSESSMENT
Must make appointment 62 y/o male with PMHX of IDDM, HTN, HLD who presented to  with progressive weakness related to COVID-19 infection.  Patient notes he first starting having symptoms last May 16th.  He had multiple negative tests initially but tested positive as of 5/19.  Patient notes several family members at home including his wife and grandson have also tested positive.  Patient states his main symptom has been fatigue/ weakness/ loss of appetite.  Patient notes no real fevers-- Tmax 99.  No significant coughing or SOB.  He has had loss of appetite/ nausea/ and some diarrhea with stool incontinence. 5/23 he presented to  ER and was given monoclonal antibodies and was told to return to the ER if sx were not improved.  Patient returned 5/25.  Spo2 was 93% in ER at rest-- patient now 94% on 3L NC.  Xray with small R infiltrate, was given remdesivir/decadron.     1. covid-19 viral syndrome/pneumonia  - s/p remdesivir #5 --> 5/29  - on decadron #6  - observe off antibiotics  - fu cbc  - supportive care  - isolation precautions  - monitor temps  - f/u inflammatory markers    2. other issues - care per medicine

## 2021-05-31 LAB
ALBUMIN SERPL ELPH-MCNC: 2 G/DL — LOW (ref 3.3–5)
ALP SERPL-CCNC: 56 U/L — SIGNIFICANT CHANGE UP (ref 40–120)
ALT FLD-CCNC: 16 U/L — SIGNIFICANT CHANGE UP (ref 12–78)
AST SERPL-CCNC: 18 U/L — SIGNIFICANT CHANGE UP (ref 15–37)
BILIRUB DIRECT SERPL-MCNC: 0.3 MG/DL — HIGH (ref 0–0.2)
BILIRUB INDIRECT FLD-MCNC: 0.4 MG/DL — SIGNIFICANT CHANGE UP (ref 0.2–1)
BILIRUB SERPL-MCNC: 0.7 MG/DL — SIGNIFICANT CHANGE UP (ref 0.2–1.2)
CREAT SERPL-MCNC: 0.83 MG/DL — SIGNIFICANT CHANGE UP (ref 0.5–1.3)
INR BLD: 1.53 RATIO — HIGH (ref 0.88–1.16)
PROT SERPL-MCNC: 6.2 GM/DL — SIGNIFICANT CHANGE UP (ref 6–8.3)
PROTHROM AB SERPL-ACNC: 17.5 SEC — HIGH (ref 10.6–13.6)

## 2021-05-31 PROCEDURE — 99232 SBSQ HOSP IP/OBS MODERATE 35: CPT

## 2021-05-31 RX ADMIN — FAMOTIDINE 20 MILLIGRAM(S): 10 INJECTION INTRAVENOUS at 22:32

## 2021-05-31 RX ADMIN — LOSARTAN POTASSIUM 25 MILLIGRAM(S): 100 TABLET, FILM COATED ORAL at 08:28

## 2021-05-31 RX ADMIN — ENOXAPARIN SODIUM 40 MILLIGRAM(S): 100 INJECTION SUBCUTANEOUS at 08:26

## 2021-05-31 RX ADMIN — Medication 8 UNIT(S): at 12:29

## 2021-05-31 RX ADMIN — OXYCODONE HYDROCHLORIDE 10 MILLIGRAM(S): 5 TABLET ORAL at 22:32

## 2021-05-31 RX ADMIN — Medication 81 MILLIGRAM(S): at 08:25

## 2021-05-31 RX ADMIN — SIMVASTATIN 20 MILLIGRAM(S): 20 TABLET, FILM COATED ORAL at 22:32

## 2021-05-31 RX ADMIN — Medication 6 MILLIGRAM(S): at 08:26

## 2021-05-31 RX ADMIN — INSULIN GLARGINE 45 UNIT(S): 100 INJECTION, SOLUTION SUBCUTANEOUS at 22:32

## 2021-05-31 RX ADMIN — Medication 100 MILLIGRAM(S): at 08:25

## 2021-05-31 RX ADMIN — Medication 8 UNIT(S): at 17:39

## 2021-05-31 RX ADMIN — Medication 2: at 12:29

## 2021-05-31 RX ADMIN — OXYCODONE HYDROCHLORIDE 10 MILLIGRAM(S): 5 TABLET ORAL at 08:46

## 2021-05-31 RX ADMIN — OXYCODONE HYDROCHLORIDE 10 MILLIGRAM(S): 5 TABLET ORAL at 08:25

## 2021-05-31 RX ADMIN — Medication 2: at 17:39

## 2021-05-31 RX ADMIN — Medication 8 UNIT(S): at 08:54

## 2021-05-31 NOTE — PROGRESS NOTE ADULT - SUBJECTIVE AND OBJECTIVE BOX
Patient is a 61y old  Male who presents with a chief complaint of weakness (26 May 2021 09:59)      SUBJECTIVE:   HPI:  62 y/o male with PMHX of IDDM, HTN, HLD who presented to  with progressive weakness related to COVID-19 infection.  Patient notes he first starting having symptoms last cony May 16th.  He had multiple negative tests initially but tested positive as of 5/19.  Patient notes several family members at home including his wife and grandson have also tested positive.  Patient states his main symptom has been fatigue/ weakness/ loss of appetite.  Patient notes no real fevers-- Tmax 99.  No significant coughing or SOB.  He has had loss of appetite/ nausea/ and some diarrhea with stool incontinence.  Yesterday, he presented to  ER and was given monoclonal antibodies and was told to return to the ER if sx were not improved.  Patient returns today.  Spo2 was 93% in ER at rest-- patient now 94% on 3L NC.      5/26/21: sitting up in bed. felt short of breath while walking to bathroom resolved after 4lpm NC. concerned about his blood sugars being elevated.   5/27/21: sitting up today. slightly confused, reports seeing things on his body and in his room.   5/28/21: seen and examined sitting up in bed. less confused today. no pain, dyspnea or fever today. plan of care and management plan discussed with pt and daughter/wife via telephone.   05/29/21: Mental status better today. He denies any complaints.   05/30/21: He denies any new complaints. O2 sats on 3L NC 95%  05/31/21: Patient seen and examined. Feels better today. O2 sats on RA on rest 95%, but on ambulation mid 80s. Discussed with patient regarding management and d/c plan.     REVIEW OF SYSTEMS:    CONSTITUTIONAL: No weakness, fevers or chills  EYES/ENT: No visual changes;  No vertigo or throat pain   NECK: No pain or stiffness  RESPIRATORY: exertional dyspnea. slight cough. no wheezing, hemoptysis; No shortness of breath  CARDIOVASCULAR: No chest pain or palpitations  GASTROINTESTINAL: No abdominal or epigastric pain. No nausea, vomiting, or hematemesis; No diarrhea or constipation. No melena or hematochezia.  GENITOURINARY: No dysuria, frequency or hematuria  NEUROLOGICAL: No numbness or weakness  SKIN: No itching, burning, rashes, or lesions   All other review of systems is negative unless indicated above      Vital Signs Last 24 Hrs  T(C): 36.6 (31 May 2021 08:30), Max: 36.8 (31 May 2021 00:06)  T(F): 97.9 (31 May 2021 08:30), Max: 98.2 (31 May 2021 00:06)  HR: 89 (31 May 2021 08:30) (71 - 89)  BP: 121/77 (31 May 2021 08:30) (115/67 - 137/72)  BP(mean): --  RR: 16 (31 May 2021 08:30) (16 - 18)  SpO2: 94% (31 May 2021 08:30) (93% - 95%)        PHYSICAL EXAM:    Constitutional: NAD, awake and alert, well-developed  HEENT: PERR, EOMI, Normal Hearing, MMM  Neck: Soft and supple, No LAD, No JVD  Respiratory: Breath sounds are clear bilaterally, No wheezing, rales or rhonchi  Cardiovascular: S1 and S2, regular rate and rhythm, no Murmurs, gallops or rubs  Gastrointestinal: Bowel Sounds present, soft, nontender, nondistended, no guarding, no rebound  Extremities: No peripheral edema  Vascular: 2+ peripheral pulses  Neurological: A/O x 3, no focal deficits. intermittent confusion   Musculoskeletal: 5/5 strength b/l upper and lower extremities  Skin: flushed face. No rashes          MEDICATIONS  (STANDING):  aspirin enteric coated 81 milliGRAM(s) Oral daily  dexAMETHasone  Injectable 6 milliGRAM(s) IV Push daily  dextrose 40% Gel 15 Gram(s) Oral once  dextrose 5%. 1000 milliLiter(s) (50 mL/Hr) IV Continuous <Continuous>  dextrose 5%. 1000 milliLiter(s) (100 mL/Hr) IV Continuous <Continuous>  dextrose 50% Injectable 25 Gram(s) IV Push once  dextrose 50% Injectable 12.5 Gram(s) IV Push once  dextrose 50% Injectable 25 Gram(s) IV Push once  enoxaparin Injectable 40 milliGRAM(s) SubCutaneous daily  famotidine    Tablet 20 milliGRAM(s) Oral at bedtime  glucagon  Injectable 1 milliGRAM(s) IntraMuscular once  insulin glargine Injectable (LANTUS) 45 Unit(s) SubCutaneous at bedtime  insulin lispro (ADMELOG) corrective regimen sliding scale   SubCutaneous three times a day before meals  insulin lispro (ADMELOG) corrective regimen sliding scale   SubCutaneous at bedtime  insulin lispro Injectable (ADMELOG) 8 Unit(s) SubCutaneous three times a day before meals  losartan 25 milliGRAM(s) Oral daily  metoprolol succinate  milliGRAM(s) Oral daily  oxyCODONE  ER Tablet 10 milliGRAM(s) Oral every 12 hours  simvastatin 20 milliGRAM(s) Oral at bedtime    MEDICATIONS  (PRN):  acetaminophen   Tablet .. 650 milliGRAM(s) Oral every 4 hours PRN Temp greater or equal to 38.5C (101.3F)  ALBUTerol    90 MICROgram(s) HFA Inhaler 2 Puff(s) Inhalation every 4 hours PRN Shortness of Breath and/or Wheezing  ALPRAZolam 0.25 milliGRAM(s) Oral two times a day PRN for anxiety  aluminum hydroxide/magnesium hydroxide/simethicone Suspension 30 milliLiter(s) Oral every 4 hours PRN Dyspepsia  hydrOXYzine hydrochloride 25 milliGRAM(s) Oral three times a day PRN Itching  ondansetron Injectable 4 milliGRAM(s) IV Push every 6 hours PRN Nausea and/or Vomiting  oxycodone    5 mG/acetaminophen 325 mG 1 Tablet(s) Oral every 4 hours PRN Moderate Pain (4 - 6)        LABS: All Labs Reviewed:                         31 May 2021 08:01    x      |  x      |  x      ----------------------------<  x      x       |  x      |  0.83       TPro  6.2    /  Alb  2.0    /  TBili  0.7    /  DBili  0.3    /  AST  18     /  ALT  16     /  AlkPhos  56     31 May 2021 08:01    LIVER FUNCTIONS - ( 31 May 2021 08:01 )  Alb: 2.0 g/dL / Pro: 6.2 gm/dL / ALK PHOS: 56 U/L / ALT: 16 U/L / AST: 18 U/L / GGT: x           PT/INR - ( 31 May 2021 08:01 )   PT: 17.5 sec;   INR: 1.53 ratio           CAPILLARY BLOOD GLUCOSE      POCT Blood Glucose.: 199 mg/dL (31 May 2021 12:26)  POCT Blood Glucose.: 142 mg/dL (31 May 2021 08:33)  POCT Blood Glucose.: 206 mg/dL (30 May 2021 20:50)  POCT Blood Glucose.: 116 mg/dL (30 May 2021 16:36)

## 2021-05-31 NOTE — PROGRESS NOTE ADULT - PROVIDER SPECIALTY LIST ADULT
Infectious Disease
Hospitalist
Infectious Disease
Hospitalist
Hospitalist
Infectious Disease
Infectious Disease
Hospitalist

## 2021-05-31 NOTE — PROGRESS NOTE ADULT - ASSESSMENT
60 y/o male with PMHX of IDDM, HTN, HLD who presented to  with progressive weakness related to COVID-19 infection.  Spo2 was 93% in ER at rest-- patient now 94% on 3L NC.      #Acute Hypoxic Respiratory Failure / acute metabolic encephalopathy secondary to COVID -19 PNA   confusion likely secondary to episodes of hypoxia, resolving  IV decadron 6 mg IV daily  IV remdesivir- completed 5 days  ID consult appreciated   S/p monoclonal AB infusion on 5/24.    cont supportive care   O2 supplement    #Uncontrolled IDDM:    monitor BGM TID while on dexamethasone   Previously using Humalog 75/25 45 units BID.    increased lantus to 45 units   increased ademelog 8 TID with meals.    Sliding scale.    a1c 9.6       #pseudoHyponatremia:    Resolved    #HTN:    Cont home meds.      #HLD:    Cont home meds.      #Chronic Pain:    Home pain meds nonformulary.    On oxycontin 10 BID.    Percocet PRN.      #DVT Proph:    Lovenox.      Disposition: Still hypoxic on ambulation. Follow O2 sats on ambulation on RA  Possible home tomorrow if medically stable

## 2021-06-01 ENCOUNTER — TRANSCRIPTION ENCOUNTER (OUTPATIENT)
Age: 61
End: 2021-06-01

## 2021-06-01 VITALS — SYSTOLIC BLOOD PRESSURE: 118 MMHG | DIASTOLIC BLOOD PRESSURE: 64 MMHG | HEART RATE: 84 BPM

## 2021-06-01 LAB
ALBUMIN SERPL ELPH-MCNC: 2.1 G/DL — LOW (ref 3.3–5)
ALP SERPL-CCNC: 59 U/L — SIGNIFICANT CHANGE UP (ref 40–120)
ALT FLD-CCNC: 19 U/L — SIGNIFICANT CHANGE UP (ref 12–78)
AST SERPL-CCNC: 15 U/L — SIGNIFICANT CHANGE UP (ref 15–37)
BILIRUB DIRECT SERPL-MCNC: 0.2 MG/DL — SIGNIFICANT CHANGE UP (ref 0–0.2)
BILIRUB INDIRECT FLD-MCNC: 0.7 MG/DL — SIGNIFICANT CHANGE UP (ref 0.2–1)
BILIRUB SERPL-MCNC: 0.9 MG/DL — SIGNIFICANT CHANGE UP (ref 0.2–1.2)
CREAT SERPL-MCNC: 0.86 MG/DL — SIGNIFICANT CHANGE UP (ref 0.5–1.3)
INR BLD: 1.42 RATIO — HIGH (ref 0.88–1.16)
PROT SERPL-MCNC: 6.8 GM/DL — SIGNIFICANT CHANGE UP (ref 6–8.3)
PROTHROM AB SERPL-ACNC: 16.2 SEC — HIGH (ref 10.6–13.6)

## 2021-06-01 PROCEDURE — 99239 HOSP IP/OBS DSCHRG MGMT >30: CPT

## 2021-06-01 RX ADMIN — Medication 2: at 12:20

## 2021-06-01 RX ADMIN — OXYCODONE HYDROCHLORIDE 10 MILLIGRAM(S): 5 TABLET ORAL at 09:08

## 2021-06-01 RX ADMIN — Medication 8 UNIT(S): at 09:09

## 2021-06-01 RX ADMIN — ENOXAPARIN SODIUM 40 MILLIGRAM(S): 100 INJECTION SUBCUTANEOUS at 09:08

## 2021-06-01 RX ADMIN — Medication 6 MILLIGRAM(S): at 09:08

## 2021-06-01 RX ADMIN — Medication 81 MILLIGRAM(S): at 09:08

## 2021-06-01 RX ADMIN — LOSARTAN POTASSIUM 25 MILLIGRAM(S): 100 TABLET, FILM COATED ORAL at 09:10

## 2021-06-01 RX ADMIN — Medication 100 MILLIGRAM(S): at 09:08

## 2021-06-01 RX ADMIN — OXYCODONE HYDROCHLORIDE 10 MILLIGRAM(S): 5 TABLET ORAL at 09:10

## 2021-06-01 RX ADMIN — Medication 8 UNIT(S): at 12:20

## 2021-06-01 NOTE — DISCHARGE NOTE PROVIDER - NSDCCPCAREPLAN_GEN_ALL_CORE_FT
PRINCIPAL DISCHARGE DIAGNOSIS  Diagnosis: COVID-19  Assessment and Plan of Treatment: It has been determined that you no longer need hospitalization and can recover while remaining in self-quarantine at home for 14 days. You should restrict activities outside your home except for getting medical care.  Do not go to work, school or public areas.  Avoid using public transportation.  Separate yourself from other people and animals in your home.  Cover your coughs and sneezes.  Clean your hands often. Avoid sharing personal household items. Clean all high touch surfaces. Monitor your symptoms, if you have a medical emergency call 911.

## 2021-06-01 NOTE — CHART NOTE - NSCHARTNOTEFT_GEN_A_CORE
HOME O2 EVALUATION    Pulse Ox Room Air Rest:94%    Pulse Ox Room Air Ambulatin%    Pulse Ox on O2          L Ambulating:    Pulse Ox Post Ambulation:

## 2021-06-01 NOTE — DISCHARGE NOTE PROVIDER - NSDCMRMEDTOKEN_GEN_ALL_CORE_FT
ALPRAZolam 0.25 mg oral tablet: 1 tab(s) orally 2 times a day, As Needed - for anxiety  Aspirin Enteric Coated 81 mg oral delayed release tablet: 1 tab(s) orally once a day  Dupixent 300 mg/2 mL subcutaneous solution: Use as directed  ***dose per DrFirst***  famotidine 40 mg oral tablet: 1 tab(s) orally once a day (at bedtime)  glimepiride 2 mg oral tablet: 1 tab(s) orally once a day  Glyxambi 25 mg-5 mg oral tablet: 1 tab(s) orally once a day (in the morning)  HumaLOG Mix 75/25 subcutaneous suspension: 45 unit(s) subcutaneously 2 times a day  hydrocodone-acetaminophen 10 mg-325 mg oral tablet: 1 tab(s) orally every 4 hours, As Needed for pain  ***per DrFirst***  hydrOXYzine hydrochloride 10 mg oral tablet: 1 tab(s) orally 3 times a day, As Needed for itching  losartan 25 mg oral tablet: 1 tab(s) orally once a day  metFORMIN 500 mg oral tablet, extended release: 1 tab(s) orally once a day  metoprolol succinate 100 mg oral tablet, extended release: 1 tab(s) orally once a day  simvastatin 20 mg oral tablet: 1 tab(s) orally once a day (at bedtime)  Xtampza ER 9 mg oral capsule, extended release: 1 cap(s) orally every 12 hours  ***On DrFirst- not on list from MD- unable to confirm if pt takes***

## 2021-06-01 NOTE — DISCHARGE NOTE PROVIDER - HOSPITAL COURSE
CC: Weakness  SUBJECTIVE:   HPI:  60 y/o male with PMHX of IDDM, HTN, HLD who presented to  with progressive weakness related to COVID-19 infection.  Patient notes he first starting having symptoms last cony May 16th.  He had multiple negative tests initially but tested positive as of 5/19.  Patient notes several family members at home including his wife and grandson have also tested positive.  Patient states his main symptom has been fatigue/ weakness/ loss of appetite.  Patient notes no real fevers-- Tmax 99.  No significant coughing or SOB.  He has had loss of appetite/ nausea/ and some diarrhea with stool incontinence.  Yesterday, he presented to  ER and was given monoclonal antibodies and was told to return to the ER if sx were not improved.  Patient returns today.  Spo2 was 93% in ER at rest-- patient now 94% on 3L NC.      Hospital course:  Pt admitted and treated for acute hypoxic respiratory failure with metabolic encephalopathy secondary to acute COVID19 PNA.  Pt completed course of remdesivir and placed on IV solumedrol 6mg with improvement in symptoms.  He was ultimately weaned off supplemental O2, now satting well on room air.  He is also s/p monoclonal AB infusion on 5/24.   His diabetes was managed accordingly with insulin.    REVIEW OF SYSTEMS: All other review of systems is negative unless indicated above.    Vital Signs Last 24 Hrs  T(C): 37 (01 Jun 2021 08:26), Max: 37 (01 Jun 2021 08:26)  T(F): 98.6 (01 Jun 2021 08:26), Max: 98.6 (01 Jun 2021 08:26)  HR: 84 (01 Jun 2021 09:01) (71 - 84)  BP: 118/64 (01 Jun 2021 09:01) (102/70 - 130/65)  BP(mean): --  RR: 18 (01 Jun 2021 08:26) (18 - 18)  SpO2: 93% (01 Jun 2021 08:26) (88% - 96%)    PHYSICAL EXAM:    Constitutional: NAD, awake and alert, well-developed  HEENT: PERR, EOMI, Normal Hearing, MMM  Neck: Soft and supple  Respiratory: Breath sounds are clear bilaterally, No wheezing, rales or rhonchi  Cardiovascular: S1 and S2, regular rate and rhythm, no Murmurs, gallops or rubs  Gastrointestinal: Bowel Sounds present, soft, nontender, nondistended, no guarding, no rebound  Extremities: No peripheral edema  Neurological: A/O x 3, no focal deficits in my limited exam      med/labs: Reviewed and interpreted       Assessment and Plan:  60 y/o male with PMHX of IDDM, HTN, HLD who presented to  with progressive weakness related to COVID-19 infection.  Spo2 was 93% in ER at rest-- patient now 94% on 3L NC.      #Acute Hypoxic Respiratory Failure / acute metabolic encephalopathy secondary to COVID -19 PNA: RESOLVED  -satting well on room air  IV decadron 6 mg IV daily x 7 days   IV remdesivir- completed 5 days  ID consult appreciated   S/p monoclonal AB infusion on 5/24.        #Uncontrolled IDDM:    resume home regimen upon discharge.    a1c 9.6 , will need outpatient follow up.     #pseudoHyponatremia:    Resolved    #HTN:    Cont home meds.      #HLD:    Cont home meds.      #Chronic Pain:    Home pain meds nonformulary.    On oxycontin 10 BID.    Percocet PRN.      #DVT Proph:    Lovenox.      discharge home with out patient follow up    Attending Statement: 40 minutes spent on total encounter and discharge planning.

## 2021-06-01 NOTE — DISCHARGE NOTE NURSING/CASE MANAGEMENT/SOCIAL WORK - PATIENT PORTAL LINK FT
You can access the FollowMyHealth Patient Portal offered by [facility] by registering at the following website: [email or web address]. By joining [employer]’s FollowMyHealth portal, you will also be able to view your health information using other applications (apps) compatible with our system.

## 2021-06-07 DIAGNOSIS — E11.65 TYPE 2 DIABETES MELLITUS WITH HYPERGLYCEMIA: ICD-10-CM

## 2021-06-07 DIAGNOSIS — E78.5 HYPERLIPIDEMIA, UNSPECIFIED: ICD-10-CM

## 2021-06-07 DIAGNOSIS — Z79.82 LONG TERM (CURRENT) USE OF ASPIRIN: ICD-10-CM

## 2021-06-07 DIAGNOSIS — N18.32 CHRONIC KIDNEY DISEASE, STAGE 3B: ICD-10-CM

## 2021-06-07 DIAGNOSIS — Z82.49 FAMILY HISTORY OF ISCHEMIC HEART DISEASE AND OTHER DISEASES OF THE CIRCULATORY SYSTEM: ICD-10-CM

## 2021-06-07 DIAGNOSIS — M54.12 RADICULOPATHY, CERVICAL REGION: ICD-10-CM

## 2021-06-07 DIAGNOSIS — Z87.891 PERSONAL HISTORY OF NICOTINE DEPENDENCE: ICD-10-CM

## 2021-06-07 DIAGNOSIS — E66.9 OBESITY, UNSPECIFIED: ICD-10-CM

## 2021-06-07 DIAGNOSIS — E11.22 TYPE 2 DIABETES MELLITUS WITH DIABETIC CHRONIC KIDNEY DISEASE: ICD-10-CM

## 2021-06-07 DIAGNOSIS — Z95.5 PRESENCE OF CORONARY ANGIOPLASTY IMPLANT AND GRAFT: ICD-10-CM

## 2021-06-07 DIAGNOSIS — I12.9 HYPERTENSIVE CHRONIC KIDNEY DISEASE WITH STAGE 1 THROUGH STAGE 4 CHRONIC KIDNEY DISEASE, OR UNSPECIFIED CHRONIC KIDNEY DISEASE: ICD-10-CM

## 2021-06-07 DIAGNOSIS — I25.10 ATHEROSCLEROTIC HEART DISEASE OF NATIVE CORONARY ARTERY WITHOUT ANGINA PECTORIS: ICD-10-CM

## 2021-06-07 DIAGNOSIS — J12.82 PNEUMONIA DUE TO CORONAVIRUS DISEASE 2019: ICD-10-CM

## 2021-06-07 DIAGNOSIS — N40.0 BENIGN PROSTATIC HYPERPLASIA WITHOUT LOWER URINARY TRACT SYMPTOMS: ICD-10-CM

## 2021-06-07 DIAGNOSIS — G89.29 OTHER CHRONIC PAIN: ICD-10-CM

## 2021-06-07 DIAGNOSIS — Z79.4 LONG TERM (CURRENT) USE OF INSULIN: ICD-10-CM

## 2021-06-07 DIAGNOSIS — U07.1 COVID-19: ICD-10-CM

## 2021-06-07 DIAGNOSIS — G93.41 METABOLIC ENCEPHALOPATHY: ICD-10-CM

## 2021-06-07 DIAGNOSIS — M54.16 RADICULOPATHY, LUMBAR REGION: ICD-10-CM

## 2021-06-07 DIAGNOSIS — J96.01 ACUTE RESPIRATORY FAILURE WITH HYPOXIA: ICD-10-CM

## 2021-06-07 DIAGNOSIS — F41.9 ANXIETY DISORDER, UNSPECIFIED: ICD-10-CM

## 2023-01-25 NOTE — ED ADULT NURSE NOTE - CAS TRG GENERAL AIRWAY, MLM
Previous Labs: No How Did The Hair Loss Occur?: gradual in onset How Severe Is Your Hair Loss?: moderate What Hair Products Do You Use?: OTC Patent

## 2024-01-10 NOTE — DISCHARGE NOTE NURSING/CASE MANAGEMENT/SOCIAL WORK - NSDCPEFALRISK_GEN_ALL_CORE
Other (Free Text): 1. Trimmed to the surface of skin at last visit. \\n2. Recommended Silicone gel pads to prevent friction and manipulation\\n3. Reassured the patient that sutures will dissolve on their own
Render Risk Assessment In Note?: no
Detail Level: Zone
Note Text (......Xxx Chief Complaint.): This diagnosis correlates with the
Patient information on fall and injury prevention
[Cough] : cough
[see HPI] : see HPI
[Negative] : Heme/Lymph

## 2025-08-25 ENCOUNTER — INPATIENT (INPATIENT)
Facility: HOSPITAL | Age: 65
LOS: 0 days | Discharge: HOME | DRG: 392 | End: 2025-08-26
Attending: INTERNAL MEDICINE | Admitting: INTERNAL MEDICINE
Payer: MEDICARE

## 2025-08-25 VITALS
HEIGHT: 69 IN | SYSTOLIC BLOOD PRESSURE: 95 MMHG | TEMPERATURE: 98 F | HEART RATE: 89 BPM | OXYGEN SATURATION: 98 % | WEIGHT: 179.9 LBS | RESPIRATION RATE: 16 BRPM | DIASTOLIC BLOOD PRESSURE: 61 MMHG

## 2025-08-25 DIAGNOSIS — R10.13 EPIGASTRIC PAIN: ICD-10-CM

## 2025-08-25 LAB
ALBUMIN SERPL ELPH-MCNC: 4.5 G/DL — SIGNIFICANT CHANGE UP (ref 3.3–5)
ALP SERPL-CCNC: 106 U/L — SIGNIFICANT CHANGE UP (ref 30–120)
ALT FLD-CCNC: 28 U/L — SIGNIFICANT CHANGE UP (ref 10–60)
ANION GAP SERPL CALC-SCNC: 12 MMOL/L — SIGNIFICANT CHANGE UP (ref 5–17)
APPEARANCE UR: CLEAR — SIGNIFICANT CHANGE UP
AST SERPL-CCNC: 16 U/L — SIGNIFICANT CHANGE UP (ref 10–40)
BASOPHILS # BLD AUTO: 0.06 K/UL — SIGNIFICANT CHANGE UP (ref 0–0.2)
BASOPHILS NFR BLD AUTO: 0.4 % — SIGNIFICANT CHANGE UP (ref 0–2)
BILIRUB DIRECT SERPL-MCNC: 0.1 MG/DL — SIGNIFICANT CHANGE UP (ref 0–0.3)
BILIRUB INDIRECT FLD-MCNC: 0.5 MG/DL — SIGNIFICANT CHANGE UP (ref 0.2–1)
BILIRUB SERPL-MCNC: 0.6 MG/DL — SIGNIFICANT CHANGE UP (ref 0.2–1.2)
BILIRUB UR-MCNC: NEGATIVE — SIGNIFICANT CHANGE UP
BUN SERPL-MCNC: 37 MG/DL — HIGH (ref 7–23)
CALCIUM SERPL-MCNC: 10.2 MG/DL — SIGNIFICANT CHANGE UP (ref 8.4–10.5)
CHLORIDE SERPL-SCNC: 99 MMOL/L — SIGNIFICANT CHANGE UP (ref 96–108)
CO2 SERPL-SCNC: 25 MMOL/L — SIGNIFICANT CHANGE UP (ref 22–31)
COLOR SPEC: YELLOW — SIGNIFICANT CHANGE UP
CREAT SERPL-MCNC: 2.56 MG/DL — HIGH (ref 0.5–1.3)
DIFF PNL FLD: NEGATIVE — SIGNIFICANT CHANGE UP
EGFR: 27 ML/MIN/1.73M2 — LOW
EGFR: 27 ML/MIN/1.73M2 — LOW
EOSINOPHIL # BLD AUTO: 0.13 K/UL — SIGNIFICANT CHANGE UP (ref 0–0.5)
EOSINOPHIL NFR BLD AUTO: 0.8 % — SIGNIFICANT CHANGE UP (ref 0–6)
FLUAV AG NPH QL: SIGNIFICANT CHANGE UP
FLUBV AG NPH QL: SIGNIFICANT CHANGE UP
GLUCOSE BLDC GLUCOMTR-MCNC: 148 MG/DL — HIGH (ref 70–99)
GLUCOSE SERPL-MCNC: 289 MG/DL — HIGH (ref 70–99)
GLUCOSE UR QL: >=1000 MG/DL
HCT VFR BLD CALC: 52 % — HIGH (ref 39–50)
HGB BLD-MCNC: 17.1 G/DL — HIGH (ref 13–17)
IMM GRANULOCYTES # BLD AUTO: 0.06 K/UL — SIGNIFICANT CHANGE UP (ref 0–0.07)
IMM GRANULOCYTES NFR BLD AUTO: 0.4 % — SIGNIFICANT CHANGE UP (ref 0–0.9)
KETONES UR QL: NEGATIVE MG/DL — SIGNIFICANT CHANGE UP
LACTATE SERPL-SCNC: 1.9 MMOL/L — SIGNIFICANT CHANGE UP (ref 0.7–2)
LEUKOCYTE ESTERASE UR-ACNC: NEGATIVE — SIGNIFICANT CHANGE UP
LIDOCAIN IGE QN: 31 U/L — SIGNIFICANT CHANGE UP (ref 16–77)
LYMPHOCYTES # BLD AUTO: 1.39 K/UL — SIGNIFICANT CHANGE UP (ref 1–3.3)
LYMPHOCYTES NFR BLD AUTO: 9 % — LOW (ref 13–44)
MCHC RBC-ENTMCNC: 32.1 PG — SIGNIFICANT CHANGE UP (ref 27–34)
MCHC RBC-ENTMCNC: 32.9 G/DL — SIGNIFICANT CHANGE UP (ref 32–36)
MCV RBC AUTO: 97.7 FL — SIGNIFICANT CHANGE UP (ref 80–100)
MONOCYTES # BLD AUTO: 0.93 K/UL — HIGH (ref 0–0.9)
MONOCYTES NFR BLD AUTO: 6 % — SIGNIFICANT CHANGE UP (ref 2–14)
NEUTROPHILS # BLD AUTO: 12.82 K/UL — HIGH (ref 1.8–7.4)
NEUTROPHILS NFR BLD AUTO: 83.4 % — HIGH (ref 43–77)
NITRITE UR-MCNC: NEGATIVE — SIGNIFICANT CHANGE UP
NRBC # BLD AUTO: 0 K/UL — SIGNIFICANT CHANGE UP (ref 0–0)
NRBC # FLD: 0 K/UL — SIGNIFICANT CHANGE UP (ref 0–0)
NRBC BLD AUTO-RTO: 0 /100 WBCS — SIGNIFICANT CHANGE UP (ref 0–0)
PH UR: 5 — SIGNIFICANT CHANGE UP (ref 5–8)
PLATELET # BLD AUTO: 226 K/UL — SIGNIFICANT CHANGE UP (ref 150–400)
PMV BLD: 9.2 FL — SIGNIFICANT CHANGE UP (ref 7–13)
POTASSIUM SERPL-MCNC: 5.3 MMOL/L — SIGNIFICANT CHANGE UP (ref 3.5–5.3)
POTASSIUM SERPL-SCNC: 5.3 MMOL/L — SIGNIFICANT CHANGE UP (ref 3.5–5.3)
PROT SERPL-MCNC: 8.2 G/DL — SIGNIFICANT CHANGE UP (ref 6–8.3)
PROT UR-MCNC: NEGATIVE MG/DL — SIGNIFICANT CHANGE UP
RBC # BLD: 5.32 M/UL — SIGNIFICANT CHANGE UP (ref 4.2–5.8)
RBC # FLD: 12.8 % — SIGNIFICANT CHANGE UP (ref 10.3–14.5)
RSV RNA NPH QL NAA+NON-PROBE: SIGNIFICANT CHANGE UP
SARS-COV-2 RNA SPEC QL NAA+PROBE: SIGNIFICANT CHANGE UP
SODIUM SERPL-SCNC: 136 MMOL/L — SIGNIFICANT CHANGE UP (ref 135–145)
SOURCE RESPIRATORY: SIGNIFICANT CHANGE UP
SP GR SPEC: 1.02 — SIGNIFICANT CHANGE UP (ref 1–1.03)
TROPONIN I, HIGH SENSITIVITY RESULT: 4.4 NG/L — SIGNIFICANT CHANGE UP
UROBILINOGEN FLD QL: 0.2 MG/DL — SIGNIFICANT CHANGE UP (ref 0.2–1)
WBC # BLD: 15.39 K/UL — HIGH (ref 3.8–10.5)
WBC # FLD AUTO: 15.39 K/UL — HIGH (ref 3.8–10.5)

## 2025-08-25 PROCEDURE — 82962 GLUCOSE BLOOD TEST: CPT

## 2025-08-25 PROCEDURE — 99222 1ST HOSP IP/OBS MODERATE 55: CPT

## 2025-08-25 PROCEDURE — 99285 EMERGENCY DEPT VISIT HI MDM: CPT

## 2025-08-25 PROCEDURE — 85025 COMPLETE CBC W/AUTO DIFF WBC: CPT

## 2025-08-25 PROCEDURE — 87637 SARSCOV2&INF A&B&RSV AMP PRB: CPT

## 2025-08-25 PROCEDURE — 83605 ASSAY OF LACTIC ACID: CPT

## 2025-08-25 PROCEDURE — 71045 X-RAY EXAM CHEST 1 VIEW: CPT

## 2025-08-25 PROCEDURE — 80076 HEPATIC FUNCTION PANEL: CPT

## 2025-08-25 PROCEDURE — 84484 ASSAY OF TROPONIN QUANT: CPT

## 2025-08-25 PROCEDURE — 71045 X-RAY EXAM CHEST 1 VIEW: CPT | Mod: 26

## 2025-08-25 PROCEDURE — 36415 COLL VENOUS BLD VENIPUNCTURE: CPT

## 2025-08-25 PROCEDURE — 80048 BASIC METABOLIC PNL TOTAL CA: CPT

## 2025-08-25 PROCEDURE — 93010 ELECTROCARDIOGRAM REPORT: CPT

## 2025-08-25 PROCEDURE — 83690 ASSAY OF LIPASE: CPT

## 2025-08-25 PROCEDURE — 81003 URINALYSIS AUTO W/O SCOPE: CPT

## 2025-08-25 RX ORDER — METFORMIN HYDROCHLORIDE 850 MG/1
1 TABLET ORAL
Refills: 0 | DISCHARGE

## 2025-08-25 RX ORDER — GLIMEPIRIDE 4 MG/1
1 TABLET ORAL
Refills: 0 | DISCHARGE

## 2025-08-25 RX ORDER — CARVEDILOL 3.12 MG/1
1 TABLET, FILM COATED ORAL
Refills: 0 | DISCHARGE

## 2025-08-25 RX ORDER — SODIUM CHLORIDE 9 G/1000ML
1000 INJECTION, SOLUTION INTRAVENOUS
Refills: 0 | Status: DISCONTINUED | OUTPATIENT
Start: 2025-08-25 | End: 2025-08-26

## 2025-08-25 RX ORDER — MELATONIN 5 MG
3 TABLET ORAL AT BEDTIME
Refills: 0 | Status: DISCONTINUED | OUTPATIENT
Start: 2025-08-25 | End: 2025-08-26

## 2025-08-25 RX ORDER — INSULIN LISPRO 100 U/ML
INJECTION, SOLUTION INTRAVENOUS; SUBCUTANEOUS AT BEDTIME
Refills: 0 | Status: DISCONTINUED | OUTPATIENT
Start: 2025-08-25 | End: 2025-08-26

## 2025-08-25 RX ORDER — HEPARIN SODIUM 1000 [USP'U]/ML
5000 INJECTION INTRAVENOUS; SUBCUTANEOUS EVERY 8 HOURS
Refills: 0 | Status: DISCONTINUED | OUTPATIENT
Start: 2025-08-25 | End: 2025-08-26

## 2025-08-25 RX ORDER — ALPRAZOLAM 0.5 MG
0.5 TABLET, EXTENDED RELEASE 24 HR ORAL THREE TIMES A DAY
Refills: 0 | Status: DISCONTINUED | OUTPATIENT
Start: 2025-08-25 | End: 2025-08-26

## 2025-08-25 RX ORDER — ALPRAZOLAM 0.5 MG
1 TABLET, EXTENDED RELEASE 24 HR ORAL
Refills: 0 | DISCHARGE

## 2025-08-25 RX ORDER — ONDANSETRON HCL/PF 4 MG/2 ML
4 VIAL (ML) INJECTION EVERY 8 HOURS
Refills: 0 | Status: DISCONTINUED | OUTPATIENT
Start: 2025-08-25 | End: 2025-08-26

## 2025-08-25 RX ORDER — DEXTROSE 50 % IN WATER 50 %
25 SYRINGE (ML) INTRAVENOUS ONCE
Refills: 0 | Status: DISCONTINUED | OUTPATIENT
Start: 2025-08-25 | End: 2025-08-26

## 2025-08-25 RX ORDER — DAPAGLIFLOZIN 5 MG/1
1 TABLET, FILM COATED ORAL
Refills: 0 | DISCHARGE

## 2025-08-25 RX ORDER — LOSARTAN POTASSIUM 100 MG/1
1 TABLET, FILM COATED ORAL
Refills: 0 | DISCHARGE

## 2025-08-25 RX ORDER — ACETAMINOPHEN 500 MG/5ML
650 LIQUID (ML) ORAL EVERY 6 HOURS
Refills: 0 | Status: DISCONTINUED | OUTPATIENT
Start: 2025-08-25 | End: 2025-08-26

## 2025-08-25 RX ORDER — ATORVASTATIN CALCIUM 80 MG/1
1 TABLET, FILM COATED ORAL
Refills: 0 | DISCHARGE

## 2025-08-25 RX ORDER — DEXTROSE 50 % IN WATER 50 %
12.5 SYRINGE (ML) INTRAVENOUS ONCE
Refills: 0 | Status: DISCONTINUED | OUTPATIENT
Start: 2025-08-25 | End: 2025-08-26

## 2025-08-25 RX ORDER — INSULIN LISPRO 100 U/ML
INJECTION, SOLUTION INTRAVENOUS; SUBCUTANEOUS
Refills: 0 | Status: DISCONTINUED | OUTPATIENT
Start: 2025-08-25 | End: 2025-08-26

## 2025-08-25 RX ORDER — GLUCAGON 3 MG/1
1 POWDER NASAL ONCE
Refills: 0 | Status: DISCONTINUED | OUTPATIENT
Start: 2025-08-25 | End: 2025-08-26

## 2025-08-25 RX ORDER — ASPIRIN 325 MG
81 TABLET ORAL DAILY
Refills: 0 | Status: DISCONTINUED | OUTPATIENT
Start: 2025-08-25 | End: 2025-08-26

## 2025-08-25 RX ORDER — MAGNESIUM, ALUMINUM HYDROXIDE 200-200 MG
30 TABLET,CHEWABLE ORAL EVERY 4 HOURS
Refills: 0 | Status: DISCONTINUED | OUTPATIENT
Start: 2025-08-25 | End: 2025-08-26

## 2025-08-25 RX ORDER — DEXTROSE 50 % IN WATER 50 %
15 SYRINGE (ML) INTRAVENOUS ONCE
Refills: 0 | Status: DISCONTINUED | OUTPATIENT
Start: 2025-08-25 | End: 2025-08-26

## 2025-08-25 RX ORDER — ONDANSETRON HCL/PF 4 MG/2 ML
4 VIAL (ML) INJECTION ONCE
Refills: 0 | Status: COMPLETED | OUTPATIENT
Start: 2025-08-25 | End: 2025-08-25

## 2025-08-25 RX ORDER — ATORVASTATIN CALCIUM 80 MG/1
40 TABLET, FILM COATED ORAL AT BEDTIME
Refills: 0 | Status: DISCONTINUED | OUTPATIENT
Start: 2025-08-25 | End: 2025-08-26

## 2025-08-25 RX ADMIN — Medication 0.5 MILLIGRAM(S): at 21:58

## 2025-08-25 RX ADMIN — Medication 2500 MILLILITER(S): at 16:02

## 2025-08-25 RX ADMIN — Medication 20 MILLIGRAM(S): at 16:02

## 2025-08-25 RX ADMIN — Medication 100 MILLILITER(S): at 21:56

## 2025-08-25 RX ADMIN — HEPARIN SODIUM 5000 UNIT(S): 1000 INJECTION INTRAVENOUS; SUBCUTANEOUS at 21:55

## 2025-08-25 RX ADMIN — ATORVASTATIN CALCIUM 40 MILLIGRAM(S): 80 TABLET, FILM COATED ORAL at 21:55

## 2025-08-25 RX ADMIN — Medication 4 MILLIGRAM(S): at 16:02

## 2025-08-26 ENCOUNTER — TRANSCRIPTION ENCOUNTER (OUTPATIENT)
Age: 65
End: 2025-08-26

## 2025-08-26 VITALS
TEMPERATURE: 98 F | SYSTOLIC BLOOD PRESSURE: 147 MMHG | DIASTOLIC BLOOD PRESSURE: 88 MMHG | RESPIRATION RATE: 18 BRPM | OXYGEN SATURATION: 99 % | HEART RATE: 66 BPM

## 2025-08-26 LAB
A1C WITH ESTIMATED AVERAGE GLUCOSE RESULT: 8 % — HIGH (ref 4–5.6)
ANION GAP SERPL CALC-SCNC: 8 MMOL/L — SIGNIFICANT CHANGE UP (ref 5–17)
BASOPHILS # BLD AUTO: 0.01 K/UL — SIGNIFICANT CHANGE UP (ref 0–0.2)
BASOPHILS NFR BLD AUTO: 0.1 % — SIGNIFICANT CHANGE UP (ref 0–2)
BUN SERPL-MCNC: 29 MG/DL — HIGH (ref 7–23)
CALCIUM SERPL-MCNC: 8.5 MG/DL — SIGNIFICANT CHANGE UP (ref 8.4–10.5)
CHLORIDE SERPL-SCNC: 106 MMOL/L — SIGNIFICANT CHANGE UP (ref 96–108)
CK SERPL-CCNC: 89 U/L — SIGNIFICANT CHANGE UP (ref 39–308)
CO2 SERPL-SCNC: 24 MMOL/L — SIGNIFICANT CHANGE UP (ref 22–31)
CREAT ?TM UR-MCNC: 23 MG/DL — SIGNIFICANT CHANGE UP
CREAT SERPL-MCNC: 1.59 MG/DL — HIGH (ref 0.5–1.3)
EGFR: 47 ML/MIN/1.73M2 — LOW
EGFR: 47 ML/MIN/1.73M2 — LOW
EOSINOPHIL # BLD AUTO: 0.16 K/UL — SIGNIFICANT CHANGE UP (ref 0–0.5)
EOSINOPHIL NFR BLD AUTO: 2.1 % — SIGNIFICANT CHANGE UP (ref 0–6)
ESTIMATED AVERAGE GLUCOSE: 183 MG/DL — HIGH (ref 68–114)
GLUCOSE BLDC GLUCOMTR-MCNC: 196 MG/DL — HIGH (ref 70–99)
GLUCOSE BLDC GLUCOMTR-MCNC: 198 MG/DL — HIGH (ref 70–99)
GLUCOSE SERPL-MCNC: 191 MG/DL — HIGH (ref 70–99)
HCT VFR BLD CALC: 42.9 % — SIGNIFICANT CHANGE UP (ref 39–50)
HGB BLD-MCNC: 14.1 G/DL — SIGNIFICANT CHANGE UP (ref 13–17)
IMM GRANULOCYTES # BLD AUTO: 0.02 K/UL — SIGNIFICANT CHANGE UP (ref 0–0.07)
IMM GRANULOCYTES NFR BLD AUTO: 0.3 % — SIGNIFICANT CHANGE UP (ref 0–0.9)
LYMPHOCYTES # BLD AUTO: 1.7 K/UL — SIGNIFICANT CHANGE UP (ref 1–3.3)
LYMPHOCYTES NFR BLD AUTO: 22.2 % — SIGNIFICANT CHANGE UP (ref 13–44)
MCHC RBC-ENTMCNC: 31.9 PG — SIGNIFICANT CHANGE UP (ref 27–34)
MCHC RBC-ENTMCNC: 32.9 G/DL — SIGNIFICANT CHANGE UP (ref 32–36)
MCV RBC AUTO: 97.1 FL — SIGNIFICANT CHANGE UP (ref 80–100)
MONOCYTES # BLD AUTO: 0.45 K/UL — SIGNIFICANT CHANGE UP (ref 0–0.9)
MONOCYTES NFR BLD AUTO: 5.9 % — SIGNIFICANT CHANGE UP (ref 2–14)
NEUTROPHILS # BLD AUTO: 5.33 K/UL — SIGNIFICANT CHANGE UP (ref 1.8–7.4)
NEUTROPHILS NFR BLD AUTO: 69.4 % — SIGNIFICANT CHANGE UP (ref 43–77)
NRBC # BLD AUTO: 0 K/UL — SIGNIFICANT CHANGE UP (ref 0–0)
NRBC # FLD: 0 K/UL — SIGNIFICANT CHANGE UP (ref 0–0)
NRBC BLD AUTO-RTO: 0 /100 WBCS — SIGNIFICANT CHANGE UP (ref 0–0)
PLATELET # BLD AUTO: 181 K/UL — SIGNIFICANT CHANGE UP (ref 150–400)
PMV BLD: 9.5 FL — SIGNIFICANT CHANGE UP (ref 7–13)
POTASSIUM SERPL-MCNC: 5 MMOL/L — SIGNIFICANT CHANGE UP (ref 3.5–5.3)
POTASSIUM SERPL-SCNC: 5 MMOL/L — SIGNIFICANT CHANGE UP (ref 3.5–5.3)
POTASSIUM UR-SCNC: 22 MMOL/L — SIGNIFICANT CHANGE UP
RBC # BLD: 4.42 M/UL — SIGNIFICANT CHANGE UP (ref 4.2–5.8)
RBC # FLD: 12.7 % — SIGNIFICANT CHANGE UP (ref 10.3–14.5)
SODIUM SERPL-SCNC: 138 MMOL/L — SIGNIFICANT CHANGE UP (ref 135–145)
SODIUM UR-SCNC: 69 MMOL/L — SIGNIFICANT CHANGE UP
TROPONIN I, HIGH SENSITIVITY RESULT: 6.5 NG/L — SIGNIFICANT CHANGE UP
WBC # BLD: 7.67 K/UL — SIGNIFICANT CHANGE UP (ref 3.8–10.5)
WBC # FLD AUTO: 7.67 K/UL — SIGNIFICANT CHANGE UP (ref 3.8–10.5)

## 2025-08-26 PROCEDURE — 71045 X-RAY EXAM CHEST 1 VIEW: CPT

## 2025-08-26 PROCEDURE — 83605 ASSAY OF LACTIC ACID: CPT

## 2025-08-26 PROCEDURE — 85025 COMPLETE CBC W/AUTO DIFF WBC: CPT

## 2025-08-26 PROCEDURE — 80048 BASIC METABOLIC PNL TOTAL CA: CPT

## 2025-08-26 PROCEDURE — 84300 ASSAY OF URINE SODIUM: CPT

## 2025-08-26 PROCEDURE — 84484 ASSAY OF TROPONIN QUANT: CPT

## 2025-08-26 PROCEDURE — 81003 URINALYSIS AUTO W/O SCOPE: CPT

## 2025-08-26 PROCEDURE — 80076 HEPATIC FUNCTION PANEL: CPT

## 2025-08-26 PROCEDURE — 82962 GLUCOSE BLOOD TEST: CPT

## 2025-08-26 PROCEDURE — 93005 ELECTROCARDIOGRAM TRACING: CPT

## 2025-08-26 PROCEDURE — 82550 ASSAY OF CK (CPK): CPT

## 2025-08-26 PROCEDURE — 36415 COLL VENOUS BLD VENIPUNCTURE: CPT

## 2025-08-26 PROCEDURE — 84133 ASSAY OF URINE POTASSIUM: CPT

## 2025-08-26 PROCEDURE — 96374 THER/PROPH/DIAG INJ IV PUSH: CPT

## 2025-08-26 PROCEDURE — 99285 EMERGENCY DEPT VISIT HI MDM: CPT | Mod: 25

## 2025-08-26 PROCEDURE — 96375 TX/PRO/DX INJ NEW DRUG ADDON: CPT

## 2025-08-26 PROCEDURE — 83690 ASSAY OF LIPASE: CPT

## 2025-08-26 PROCEDURE — 82570 ASSAY OF URINE CREATININE: CPT

## 2025-08-26 PROCEDURE — 83036 HEMOGLOBIN GLYCOSYLATED A1C: CPT

## 2025-08-26 PROCEDURE — 87637 SARSCOV2&INF A&B&RSV AMP PRB: CPT

## 2025-08-26 PROCEDURE — 99239 HOSP IP/OBS DSCHRG MGMT >30: CPT

## 2025-08-26 RX ADMIN — INSULIN LISPRO 1: 100 INJECTION, SOLUTION INTRAVENOUS; SUBCUTANEOUS at 12:21

## 2025-08-26 RX ADMIN — INSULIN LISPRO 1: 100 INJECTION, SOLUTION INTRAVENOUS; SUBCUTANEOUS at 08:05

## 2025-08-26 RX ADMIN — Medication 40 MILLIGRAM(S): at 06:02

## 2025-08-26 RX ADMIN — HEPARIN SODIUM 5000 UNIT(S): 1000 INJECTION INTRAVENOUS; SUBCUTANEOUS at 06:06

## 2025-08-26 RX ADMIN — Medication 81 MILLIGRAM(S): at 12:50

## 2025-08-26 RX ADMIN — Medication 100 MILLILITER(S): at 06:07
